# Patient Record
Sex: MALE | Race: WHITE | NOT HISPANIC OR LATINO | Employment: OTHER | URBAN - METROPOLITAN AREA
[De-identification: names, ages, dates, MRNs, and addresses within clinical notes are randomized per-mention and may not be internally consistent; named-entity substitution may affect disease eponyms.]

---

## 2017-01-02 ASSESSMENT — ENCOUNTER SYMPTOMS
INCREASED ENERGY: 0
ALTERED TEMPERATURE REGULATION: 0
HALLUCINATIONS: 0
CHILLS: 0
FEVER: 0
DECREASED APPETITE: 0
FATIGUE: 1
POLYPHAGIA: 0
NIGHT SWEATS: 1
POLYDIPSIA: 0
WEIGHT GAIN: 0
WEIGHT LOSS: 0

## 2017-01-05 ENCOUNTER — OFFICE VISIT (OUTPATIENT)
Dept: ORTHOPEDICS | Facility: CLINIC | Age: 52
End: 2017-01-05

## 2017-01-05 VITALS — WEIGHT: 287 LBS | HEIGHT: 76 IN | BODY MASS INDEX: 34.95 KG/M2

## 2017-01-05 DIAGNOSIS — M99.73 CONNECTIVE TISSUE AND DISC STENOSIS OF INTERVERTEBRAL FORAMINA OF LUMBAR REGION: ICD-10-CM

## 2017-01-05 DIAGNOSIS — M54.5 LOW BACK PAIN, UNSPECIFIED BACK PAIN LATERALITY, UNSPECIFIED CHRONICITY, WITH SCIATICA PRESENCE UNSPECIFIED: Primary | ICD-10-CM

## 2017-01-05 DIAGNOSIS — M47.26 OSTEOARTHRITIS OF SPINE WITH RADICULOPATHY, LUMBAR REGION: Primary | ICD-10-CM

## 2017-01-05 NOTE — Clinical Note
1/5/2017      RE: Tyler Barreto  [address] [facility] [address]       Orthopaedic Spine Clinic Consult Note 01/05/2017    Primary Care Physician: Taras Nagel  Referring Physician: Dr. Jules March    Chief Complaint: low back numbness, right leg radicupolathy    Hx of Present Illness:   Tyler Barreto is a pleasant 51 year old male with no signifcicant past medical history who presents to clinic today for evaluation of low back numbness and right leg radiculopathy.  Patient has had low back numbness and right leg pain/weakness/numbness/tingling since 10 years ago that has been gradually getting worse without any inciting event.  10% is back symptoms and 90% is right L4/L5 radicular pain.  Pain is 2/10 at best and 8/10 at worst.  Symptoms are exacerbated by standing and walking for long periods of time and improved with sitting and leaning forward. Patient denies bowel/bladder dysfunction.  Patient has normal dexterity in upper and lower extremities. Oswestry Disability Index is 20.    Conservative measures tried include:  Injections: none  Therapies: chiropractor 5-6 years ago  Medications: ibuprofen    Previous spine surgeries: none    This is not a worker's compensation injury.    Review of Systems:   Const: (negative, no fevers/chills or weight loss) ENT: (negative) Pulm: (negative, no shortness of breath) Cardio: (negative, no chest pain) GI: (negative) : (negative) Heme: (negative) MSK: (negative except as stated in HPI) Skin: (negative) Neuro: (negative except as stated in HPI)    Past Medical Hx:  No past medical history on file.    Past Surgical Hx:  No past surgical history on file.    Allergies:  No known allergies    Medications:    Current outpatient prescriptions:      traZODone (DESYREL) 50 MG tablet, Take 1-2 tablets ( mg) by mouth At Bedtime, Disp: 90 tablet, Rfl: 1    Social Hx:  Social History     Social History     Marital Status:      Spouse Name: Marianne      "Number of Children: 3     Years of Education: N/A     Occupational History      [employer]     Social History Main Topics     Smoking status: Former Smoker     Quit date: 01/21/1994     Smokeless tobacco: Current User     Types: Chew      Comment: Declined quit help     Alcohol Use: Yes     Drug Use: None     Sexual Activity: Not Asked     Other Topics Concern     None     Social History Narrative       Family Hx:  family history includes DIABETES in his mother; Eye Disorder in his father and mother; Hypertension in his mother.    Physical Exam:  Ht 1.94 m (6' 4.38\")  Wt 130.182 kg (287 lb)  BMI 34.59 kg/m2  General: no acute distress, alert and oriented x3   HEENT: head normocephalic and atraumatic, trachea midline  CV: skin is warm, peripheral pulses intact  Pulm: breathing is non-labored  Abd: soft, non-distended, non-tender    NEUROLOGICAL:  Lower extremities:  Motor Strength Right Left   Hip flexion: L1, L2, L3 5/5 5/5   Hip adduction: L2, L3 5/5 5/5   Knee flexion: S1 5/5 5/5   Knee extension: L3, L4 5/5 5/5   Ankle dosiflexion: L4, L5 5/5 5/5   EHL: L5 5/5 5/5   Ankle plantarflexion: S1 5/5 5/5   Sensation to Light Touch Right Left   Groin: L1 2/2 2/2   Medial thigh: L2 2/2 2/2   Anterior med thigh: L3 2/2 2/2   Med foot/med leg/ant knee thigh: L4 1/2 2/2   Dorsum foot/lateral leg: L5 1/2 2/2   Lat foot/post lat leg/post thigh: S1 2/2 2/2   Heel/ post med leg/ post med thigh: S2 2/2 2/2     Reflexes Right Left   Patellar 2+ 2+   Achilles 1+ 1+   Clonus no no     Fine motor coordination: Intact accuracy and speed bilaterally. Rhomberg test negative.    MUSCULOSKELETAL:  Inspection: no obvious kyphosis or lordosis, bony deformity, ecchymoses, erythema, edema, or skin lesions  ROM:   Back: full b/l forward flexion, extension, lateral flexion, rotation   Hip: full b/l flexion, extension, abduction, adduction, internal rotation, external rotation  Palpation:   Spinal facets: negative for " tenderness   Paraspinal: negative for tenderness   Iliac crest: negative for tenderness   SI joints: negative for tenderness   Sciatic notch: positive on right for tenderness  Tests:   Straight leg raise: positive for radiating pain down right leg    Imaging: Films reviewed with patient and family.  Plain x-rays: 1/5/17 XR lumbar spine   Impression:    1. Multilevel degenerative changes of the lumbar spine with most pronounced disc space narrowing and facet sclerosis at L4-L5 and L5-S1.  2. Grade 1 retrolisthesis of L4 on L5.    MRI: 9/16/16 MR lumbar spine w/o contrast  Impression:  - DDD L3-S1 with moderate central disc protrusion   - Right transforaminal stenosis L4-5 and L5-S1    Assessment: 51 year old male with 10 year history of gradual worsening without inciting event low back numbness and right leg pain/weakness/numbness/tingling, 90% right leg L4/L5 radiculopathy and 10% low back symptoms, with neurogenic claudication, without myelopathy.     Plan:  - Right L5 root transforaminal epidural steroid injection  - Discussed possible right L5-S1 foraminotomy as next step after NEWTON depending on results  - 500 mg Calcium twice a day and 1000 international units Vitamin D twice a day  - Discussed avoidance of lifting >10 lbs and excessive twisting, bending for 3 months after surgery    The patient is in agreement with plan above and has no further questions at this time. Tyler Barreto will follow up after transforaminal NEWTON or sooner if questions, concerns, or change in symptoms arise.    This patient was seen by and discussed with Dr. Rocha who is in agreement with the above assessment and plan.     Yordy Sarah MD, PhD 01/05/2017  Orthopaedic Surgery Resident, PGY-1  Pager: [phone]    Seen with PGY-1 Tyrel.  Pls see resident note.  Consult seen at request of Dr. Jules March (Orthopaedics, [facility]).    S> 50m, with 90% R leg (L5), 10% back pain x 10 yrs.  Worse with standing/walking; improved  with sitting/leaning forward.    Chiropractic tx 5-6 yrs ago; no formal PT.  No injections.  No spine surgery.    O> Mild decreased sensation R L5.  Otherwise neuro intact.  MRI and x-rays: (+) degen disc changes L3-4,L4-5,L5-S1.  (+) R foraminal stenosis L5-S1.    A> R L5 radiculopathy 2' to R L5-S1 foraminal stenosis.    P>  R L5 TFESI - explained dxtic purpose, and potential txtic effect.  - If with good response, may consider MIS R L5-S1 hemilaminectomy-foraminotomy.  Will not recommend fusion for degenerated discs for the following reasons: (1) much bigger/riskier surgery; (2) tradeoffs including stiffness and adjacent segment problems; (3) his pain is leg >> back.    - If (-) response, consider EMG.    TT > 30 mins, > 50% CC.    Jem Rocha MD

## 2017-01-05 NOTE — Clinical Note
1/5/2017       RE: Tyler Barreto  [address] [facility] [address]     Dear Colleague,    Thank you for referring your patient, Tyler Barreto, to the [facility] ORTHOPAEDIC [facility]IC at [facility]. Please see a copy of my visit note below.    Orthopaedic Spine Clinic Consult Note 01/05/2017    Primary Care Physician: Taras Nagel  Referring Physician: Dr. Jules March    Chief Complaint: low back numbness, right leg radicupolathy    Hx of Present Illness:   Tyler Barreto is a pleasant 51 year old male with no signifcicant past medical history who presents to clinic today for evaluation of low back numbness and right leg radiculopathy.  Patient has had low back numbness and right leg pain/weakness/numbness/tingling since 10 years ago that has been gradually getting worse without any inciting event.  10% is back symptoms and 90% is right L4/L5 radicular pain.  Pain is 2/10 at best and 8/10 at worst.  Symptoms are exacerbated by standing and walking for long periods of time and improved with sitting and leaning forward. Patient denies bowel/bladder dysfunction.  Patient has normal dexterity in upper and lower extremities. Oswestry Disability Index is 20.    Conservative measures tried include:  Injections: none  Therapies: chiropractor 5-6 years ago  Medications: ibuprofen    Previous spine surgeries: none    This is not a worker's compensation injury.    Review of Systems:   Const: (negative, no fevers/chills or weight loss) ENT: (negative) Pulm: (negative, no shortness of breath) Cardio: (negative, no chest pain) GI: (negative) : (negative) Heme: (negative) MSK: (negative except as stated in HPI) Skin: (negative) Neuro: (negative except as stated in HPI)    Past Medical Hx:  No past medical history on file.    Past Surgical Hx:  No past surgical history on file.    Allergies:  No known allergies    Medications:    Current outpatient prescriptions:       "traZODone (DESYREL) 50 MG tablet, Take 1-2 tablets ( mg) by mouth At Bedtime, Disp: 90 tablet, Rfl: 1    Social Hx:  Social History     Social History     Marital Status:      Spouse Name: Marianne     Number of Children: 3     Years of Education: N/A     Occupational History      [employer]     Social History Main Topics     Smoking status: Former Smoker     Quit date: 01/21/1994     Smokeless tobacco: Current User     Types: Chew      Comment: Declined quit help     Alcohol Use: Yes     Drug Use: None     Sexual Activity: Not Asked     Other Topics Concern     None     Social History Narrative       Family Hx:  family history includes DIABETES in his mother; Eye Disorder in his father and mother; Hypertension in his mother.    Physical Exam:  Ht 1.94 m (6' 4.38\")  Wt 130.182 kg (287 lb)  BMI 34.59 kg/m2  General: no acute distress, alert and oriented x3   HEENT: head normocephalic and atraumatic, trachea midline  CV: skin is warm, peripheral pulses intact  Pulm: breathing is non-labored  Abd: soft, non-distended, non-tender    NEUROLOGICAL:  Lower extremities:  Motor Strength Right Left   Hip flexion: L1, L2, L3 5/5 5/5   Hip adduction: L2, L3 5/5 5/5   Knee flexion: S1 5/5 5/5   Knee extension: L3, L4 5/5 5/5   Ankle dosiflexion: L4, L5 5/5 5/5   EHL: L5 5/5 5/5   Ankle plantarflexion: S1 5/5 ***/5   Sensation to Light Touch Right Left   Groin: L1 2/2 2/2   Medial thigh: L2 2/2 2/2   Anterior med thigh: L3 2/2 2/2   Med foot/med leg/ant knee thigh: L4 1/2 2/2   Dorsum foot/lateral leg: L5 1/2 2/2   Lat foot/post lat leg/post thigh: S1 2/2 2/2   Heel/ post med leg/ post med thigh: S2 2/2 2/2     Reflexes Right Left   Patellar 2+ 2+   Achilles 1+ 1+   Clonus no no     Fine motor coordination: Intact accuracy and speed bilaterally. Rhomberg test negative.    MUSCULOSKELETAL:  Inspection: no obvious kyphosis or lordosis, bony deformity, ecchymoses, erythema, edema, or skin lesions  ROM:   Back: " full b/l forward flexion, extension, lateral flexion, rotation   Hip: full b/l flexion, extension, abduction, adduction, internal rotation, external rotation  Palpation:   Spinal facets: negative for tenderness   Paraspinal: negative for tenderness   Iliac crest: negative for tenderness   SI joints: negative for tenderness   Sciatic notch: positive on right for tenderness  Tests:   Straight leg raise: positive for radiating pain down right leg    Imaging: Films reviewed with patient and family.  Plain x-rays: 1/5/17 XR lumbar spine   Impression:    1. Multilevel degenerative changes of the lumbar spine with most pronounced disc space narrowing and facet sclerosis at L4-L5 and L5-S1.  2. Grade 1 retrolisthesis of L4 on L5.    MRI: 9/16/16 MR lumbar spine w/o contrast  Impression:  - DDD L3-S1 with moderate central disc protrusion   - Right transforaminal stenosis L4-5 and L5-S1    Assessment: 51 year old male with 10 year history of gradual worsening without inciting event low back numbness and right leg pain/weakness/numbness/tingling, 90% right leg L4/L5 radiculopathy and 10% low back symptoms, with neurogenic claudication, without myelopathy.     Plan:  - L5 root transforaminal epidural steroid injection  - Discussed possible right L5-S1 foraminotomy as next step after NEWTON depending on results  - 500 mg Calcium twice a day and 1000 international units Vitamin D twice a day  - Discussed avoidance of lifting >10 lbs and excessive twisting, bending for 3 months after surgery    The patient is in agreement with plan above and has no further questions at this time. Tyler Barreto will follow up after transforaminal NEWTON or sooner if questions, concerns, or change in symptoms arise.    This patient was seen by and discussed with Dr. Rocha who is in agreement with the above assessment and plan.     Yordy Sarah MD, PhD 01/05/2017  Orthopaedic Surgery Resident, PGY-1  Pager: [phone]    Seen with PGY-1 Tyrel.   Pls see resident note.  Consult seen at request of Dr. Jules March (Orthopaedics, [facility]).    S> 50m, with 90% R leg (L5), 10% back pain x 10 yrs.  Worse with standing/walking; improved with sitting/leaning forward.    Chiropractic tx 5-6 yrs ago; no formal PT.  No injections.  No spine surgery.    O> Mild decreased sensation R L5.  Otherwise neuro intact.  MRI and x-rays: (+) degen disc changes L3-4,L4-5,L5-S1.  (+) R foraminal stenosis L5-S1.    A> R L5 radiculopathy 2' to R L5-S1 foraminal stenosis.    P>  R L5 TFESI - explained dxtic purpose, and potential txtic effect.  - If with good response, may consider MIS R L5-S1 hemilaminectomy-foraminotomy.  Will not recommend fusion for degenerated discs for the following reasons: (1) much bigger/riskier surgery; (2) tradeoffs including stiffness and adjacent segment problems; (3) his pain is leg >> back.    - If (-) response, consider EMG.    TT > 30 mins, > 50% CC.    Again, thank you for allowing me to participate in the care of your patient.      Sincerely,    Jem Rocha MD

## 2017-01-05 NOTE — PROGRESS NOTES
Orthopaedic Spine Clinic Consult Note 01/05/2017    Primary Care Physician: Taras Nagel  Referring Physician: Dr. Jules March    Chief Complaint: low back numbness, right leg radicupolathy    Hx of Present Illness:   Tyler Barreto is a pleasant 51 year old male with no signifcicant past medical history who presents to clinic today for evaluation of low back numbness and right leg radiculopathy.  Patient has had low back numbness and right leg pain/weakness/numbness/tingling since 10 years ago that has been gradually getting worse without any inciting event.  10% is back symptoms and 90% is right L4/L5 radicular pain.  Pain is 2/10 at best and 8/10 at worst.  Symptoms are exacerbated by standing and walking for long periods of time and improved with sitting and leaning forward. Patient denies bowel/bladder dysfunction.  Patient has normal dexterity in upper and lower extremities. Oswestry Disability Index is 20.    Conservative measures tried include:  Injections: none  Therapies: chiropractor 5-6 years ago  Medications: ibuprofen    Previous spine surgeries: none    This is not a worker's compensation injury.    Review of Systems:   Const: (negative, no fevers/chills or weight loss) ENT: (negative) Pulm: (negative, no shortness of breath) Cardio: (negative, no chest pain) GI: (negative) : (negative) Heme: (negative) MSK: (negative except as stated in HPI) Skin: (negative) Neuro: (negative except as stated in HPI)    Past Medical Hx:  No past medical history on file.    Past Surgical Hx:  No past surgical history on file.    Allergies:  No known allergies    Medications:    Current outpatient prescriptions:      traZODone (DESYREL) 50 MG tablet, Take 1-2 tablets ( mg) by mouth At Bedtime, Disp: 90 tablet, Rfl: 1    Social Hx:  Social History     Social History     Marital Status:      Spouse Name: Marianne     Number of Children: 3     Years of Education: N/A     Occupational History       "[employer]     Social History Main Topics     Smoking status: Former Smoker     Quit date: 01/21/1994     Smokeless tobacco: Current User     Types: Chew      Comment: Declined quit help     Alcohol Use: Yes     Drug Use: None     Sexual Activity: Not Asked     Other Topics Concern     None     Social History Narrative       Family Hx:  family history includes DIABETES in his mother; Eye Disorder in his father and mother; Hypertension in his mother.    Physical Exam:  Ht 1.94 m (6' 4.38\")  Wt 130.182 kg (287 lb)  BMI 34.59 kg/m2  General: no acute distress, alert and oriented x3   HEENT: head normocephalic and atraumatic, trachea midline  CV: skin is warm, peripheral pulses intact  Pulm: breathing is non-labored  Abd: soft, non-distended, non-tender    NEUROLOGICAL:  Lower extremities:  Motor Strength Right Left   Hip flexion: L1, L2, L3 5/5 5/5   Hip adduction: L2, L3 5/5 5/5   Knee flexion: S1 5/5 5/5   Knee extension: L3, L4 5/5 5/5   Ankle dosiflexion: L4, L5 5/5 5/5   EHL: L5 5/5 5/5   Ankle plantarflexion: S1 5/5 5/5   Sensation to Light Touch Right Left   Groin: L1 2/2 2/2   Medial thigh: L2 2/2 2/2   Anterior med thigh: L3 2/2 2/2   Med foot/med leg/ant knee thigh: L4 1/2 2/2   Dorsum foot/lateral leg: L5 1/2 2/2   Lat foot/post lat leg/post thigh: S1 2/2 2/2   Heel/ post med leg/ post med thigh: S2 2/2 2/2     Reflexes Right Left   Patellar 2+ 2+   Achilles 1+ 1+   Clonus no no     Fine motor coordination: Intact accuracy and speed bilaterally. Rhomberg test negative.    MUSCULOSKELETAL:  Inspection: no obvious kyphosis or lordosis, bony deformity, ecchymoses, erythema, edema, or skin lesions  ROM:   Back: full b/l forward flexion, extension, lateral flexion, rotation   Hip: full b/l flexion, extension, abduction, adduction, internal rotation, external rotation  Palpation:   Spinal facets: negative for tenderness   Paraspinal: negative for tenderness   Iliac crest: negative for tenderness   SI " joints: negative for tenderness   Sciatic notch: positive on right for tenderness  Tests:   Straight leg raise: positive for radiating pain down right leg    Imaging: Films reviewed with patient and family.  Plain x-rays: 1/5/17 XR lumbar spine   Impression:    1. Multilevel degenerative changes of the lumbar spine with most pronounced disc space narrowing and facet sclerosis at L4-L5 and L5-S1.  2. Grade 1 retrolisthesis of L4 on L5.    MRI: 9/16/16 MR lumbar spine w/o contrast  Impression:  - DDD L3-S1 with moderate central disc protrusion   - Right transforaminal stenosis L4-5 and L5-S1    Assessment: 51 year old male with 10 year history of gradual worsening without inciting event low back numbness and right leg pain/weakness/numbness/tingling, 90% right leg L4/L5 radiculopathy and 10% low back symptoms, with neurogenic claudication, without myelopathy.     Plan:  - Right L5 root transforaminal epidural steroid injection  - Discussed possible right L5-S1 foraminotomy as next step after NEWTON depending on results  - 500 mg Calcium twice a day and 1000 international units Vitamin D twice a day  - Discussed avoidance of lifting >10 lbs and excessive twisting, bending for 3 months after surgery    The patient is in agreement with plan above and has no further questions at this time. Tyler Barreto will follow up after transforaminal NEWTON or sooner if questions, concerns, or change in symptoms arise.    This patient was seen by and discussed with Dr. Rocha who is in agreement with the above assessment and plan.     Yordy Sarah MD, PhD 01/05/2017  Orthopaedic Surgery Resident, PGY-1  Pager: [phone]

## 2017-01-05 NOTE — NURSING NOTE
"Reason For Visit:   Chief Complaint   Patient presents with     Consult     Lumbar DDD, Spinal Stenosis.        Primary MD: Taras Marina  Ref. MD: Dr. Jules March. [place].     ?  No    Occupation:  [employer].  Currently working? Yes.  Work status?  Full time.    Date of injury: none  Type of injury: none.    Date of surgery: none.   Type of surgery: none.    Smoker: No  Request smoking cessation information: No    Ht 1.94 m (6' 4.38\")  Wt 130.182 kg (287 lb)  BMI 34.59 kg/m2    Pain Assessment  Patient Currently in Pain: Yes  Primary Pain Location: Back  Pain Orientation: Lower  Other Pain Locations: Radiates down right leg. Numbness and pins and needles in anterior right foot.   Pain Descriptors: Constant, Shooting, Dull, Aching  Alleviating Factors: Stretching, Rest  Aggravating Factors: Standing, Walking    Oswestry (CARY) Questionnaire    OSWESTRY DISABILITY INDEX 1/2/2017   SECTION 1-PAIN INTENSITY 2  The pain is moderate at the moment.   SECTION 2-PERSONAL CARE (WASHING,DRESSING,ETC.) 0  I can look after myself normally without causing additional pain.   SECTION 3-LIFTING 0  I can lift heavy weights without additional pain.   SECTION 4-WALKING 2  Pain prevents me from walking more than a quarter of a mile.   SECTION 5-SITTING 0  I can sit in any chair as long as I like   SECTION 6-STANDING 2  Pain prevents me from standing for more than 1 hour.   SECTION 7-SLEEPING 1  My sleep is occasionally interrupted by pain.   SECTION 8-SEX LIFE (IF APPLICABLE) 1  My sex life is normal but causes some additional pain.   SECTION 9-SOCIAL LIFE 1  My social life is normal but increases the degree of pain.   SECTION 10-TRAVELING 1  I can travel anywhere but it gives me additional pain.   Oswestry Disability Index: Count 10   CARY: Total Score = SUM (total for answered questions) 10   Computed Oswestry Score 20 (%)                  Numeric Rating Scale:  VAS Scores     VAS Survey 1/2/2017   What " is your level of back pain during the last week: 4.0   What is your level of RIGHT leg pain during the last week: 7.0   What is your level of LEFT leg pain during the last week: 0   What is your level of neck pain during the last week: 0   What is your level of RIGHT arm pain during the last week: 0   What is your level of LEFT arm pain during the last week: 0                Promis 10 Assessment    PROMIS 10 1/2/2017   In general, would you say your health is: Very Good = 4   In general, would you say your quality of life is: Very good = 4   In general, how would you rate your physical health? Very good = 4   In general, how would you rate your mental health, including your mood and your ability to think? Good = 3   In general, how would you rate your satisfaction with your social activities and relationships? Good = 3   In general, please rate how well you carry out your usual social activities and roles Very good = 4   To what extent are you able to carry out your everyday physical activities such as walking, climbing stairs, carrying groceries, or moving a chair? Moderately = 3   How often have you been bothered by emotional problems such as feeling anxious, depressed or irritable? Sometimes = 3   How would you rate your fatigue on average? Moderate = 3   How would you rate your pain on average?   0 = No Pain  to  10 = Worst Imaginable Pain 4   Global Physical Health Score : Raw Score 13 (SUM : G03 - G06 - G07 - G08)   Global Mentall Health Score : Raw Score 13 (SUM : G02 - G04 - G05 - G10)   Total (Physical + Mental Health Score) 26

## 2017-01-05 NOTE — NURSING NOTE
SAFETY INJECTION WORKSHEET    Epidural Steriod Injection: lumbar spine (Right L5-S1 (L5 root) transforaminal NEWTON)  Are you on blood thinners or platelet inhibitors? No  Are you a diabetic? No  Are you allergic to dye? No  Are you currently on antibiotics? No  Weight? Wt Readings from Last 1 Encounters:  01/05/17 : 130.182 kg (287 lb)    Have you had a flu shot within the last 10 days? No     Written education materials provided and verbal directions given per RN delegation. Ashley Randle LPN

## 2017-01-05 NOTE — MR AVS SNAPSHOT
After Visit Summary   1/5/2017    Tyler Barreto    MRN: [id number]           Patient Information     Date Of Birth          1965        Visit Information        Provider Department      1/5/2017 9:00 AM Jem Rocha MD [facility]        Today's Diagnoses     Osteoarthritis of spine with radiculopathy, lumbar region    -  1        Follow-ups after your visit        Your next 10 appointments already scheduled     Jan 09, 2017 10:00 AM   XR LUMBAR TRANSFORAMINAL INJ SINGLE with UCXR3,  IMAGING NURSE,  NEURO RAD   [facility] ([facility])    [address] [address] [phone]           For nerve root injection, please send or bring copies of any MRIs or other scans you have had.  Bring a list of your current medicines to your exam. (Include vitamins, minerals and over-the-counter medicines.) Leave your valuables at home.  Plan to have someone drive you home afterward.  Stop taking the following medicines (but talk to your doctor first):   If you take blood thinners, you may need to stop taking them a few days before treatment. Talk to your doctor before stopping these medicines.Stop taking Coumadin (warfarin) 3 days before treatment. Restart the day after treatment.   If you take Plavix, Ticlid, Pletal or Persantine, please ask your doctor if you should stop these medicines. You may need extra tests on the morning of your scan. You may take your other medicines as normal.  Stop all food and drink (including water) 3 hours before your test or treatment.  Please tell the doctor:   If you are allergic to X-ray dye (contrast fluid).   If you may be pregnant.  Injections take about 30 to 45 minutes. Most people spend up to 2 hours in the clinic or hospital.  Please call the Imaging Department at your exam site with any questions              Future tests that were ordered for you today      "Open Future Orders        Priority Expected Expires Ordered    X-ray Transforaminal lumbar single inj Routine 1/5/2017 1/5/2018 1/5/2017            Who to contact     Please call your clinic at [phone] to:    Ask questions about your health    Make or cancel appointments    Discuss your medicines    Learn about your test results    Speak to your doctor   If you have compliments or concerns about an experience at your clinic, or if you wish to file a complaint, please contact [facility] Patient Relations at [phone] or email us at [email or web address]         Additional Information About Your Visit        [employer] Information     [employer] gives you secure access to your electronic health record. If you see a primary care provider, you can also send messages to your care team and make appointments. If you have questions, please call your primary care clinic.  If you do not have a primary care provider, please call [phone] and they will assist you.      [employer] is an electronic gateway that provides easy, online access to your medical records. With [employer], you can request a clinic appointment, read your test results, renew a prescription or communicate with your care team.     To access your existing account, please contact your [facility] or call [phone] for assistance.        Care EveryWhere ID     This is your Care EveryWhere ID. This could be used by other organizations to access your [place] medical records  [id number]        Your Vitals Were     Height BMI (Body Mass Index)                1.94 m (6' 4.38\") 34.59 kg/m2           Blood Pressure from Last 3 Encounters:   09/10/13 136/82   02/18/13 124/68   02/11/13 146/83    Weight from Last 3 Encounters:   01/05/17 130.182 kg (287 lb)   09/10/13 112.7 kg (248 lb 7.3 oz)   02/18/13 110.678 kg (244 lb)               Primary Care Provider Office Phone # Fax #    Taras Marina MD " [phone]       [facility] [address] [facility] [address]  [place] [facility] [address]        Thank you!     Thank you for choosing [facility]  for your care. Our goal is always to provide you with excellent care. Hearing back from our patients is one way we can continue to improve our services. Please take a few minutes to complete the written survey that you may receive in the mail after your visit with us. Thank you!             Your Updated Medication List - Protect others around you: Learn how to safely use, store and throw away your medicines at www.disposemymeds.org.          This list is accurate as of: 1/5/17 10:42 AM.  Always use your most recent med list.                   Brand Name Dispense Instructions for use    traZODone 50 MG tablet    DESYREL    90 tablet    Take 1-2 tablets ( mg) by mouth At Bedtime

## 2017-01-08 PROBLEM — M47.26 OSTEOARTHRITIS OF SPINE WITH RADICULOPATHY, LUMBAR REGION: Status: ACTIVE | Noted: 2017-01-08

## 2017-01-08 PROBLEM — M99.73 CONNECTIVE TISSUE AND DISC STENOSIS OF INTERVERTEBRAL FORAMINA OF LUMBAR REGION: Status: ACTIVE | Noted: 2017-01-08

## 2017-01-08 NOTE — PROGRESS NOTES
Seen with PGY-1 Tyrel.  Pls see resident note.  Consult seen at request of Dr. Jules March (Orthopaedics, [facility]).    S> 50m, with 90% R leg (L5), 10% back pain x 10 yrs.  Worse with standing/walking; improved with sitting/leaning forward.    Chiropractic tx 5-6 yrs ago; no formal PT.  No injections.  No spine surgery.    O> Mild decreased sensation R L5.  Otherwise neuro intact.  MRI and x-rays: (+) degen disc changes L3-4,L4-5,L5-S1.  (+) R foraminal stenosis L5-S1.    A> R L5 radiculopathy 2' to R L5-S1 foraminal stenosis.    P>  R L5 TFESI - explained dxtic purpose, and potential txtic effect.  - If with good response, may consider MIS R L5-S1 hemilaminectomy-foraminotomy.  Will not recommend fusion for degenerated discs for the following reasons: (1) much bigger/riskier surgery; (2) tradeoffs including stiffness and adjacent segment problems; (3) his pain is leg >> back.    - If (-) response, consider EMG.    TT > 30 mins, > 50% CC.

## 2017-08-23 DIAGNOSIS — M54.9 BACK PAIN, UNSPECIFIED BACK LOCATION, UNSPECIFIED BACK PAIN LATERALITY, UNSPECIFIED CHRONICITY: Primary | ICD-10-CM

## 2017-08-24 ENCOUNTER — OFFICE VISIT (OUTPATIENT)
Dept: ORTHOPEDICS | Facility: CLINIC | Age: 52
End: 2017-08-24

## 2017-08-24 VITALS — BODY MASS INDEX: 33.69 KG/M2 | WEIGHT: 285.3 LBS | HEIGHT: 77 IN

## 2017-08-24 DIAGNOSIS — M51.26 DISPLACEMENT OF LUMBAR INTERVERTEBRAL DISC WITHOUT MYELOPATHY: ICD-10-CM

## 2017-08-24 DIAGNOSIS — M99.73 CONNECTIVE TISSUE AND DISC STENOSIS OF INTERVERTEBRAL FORAMINA OF LUMBAR REGION: Primary | ICD-10-CM

## 2017-08-24 DIAGNOSIS — M54.16 LUMBAR RADICULOPATHY: ICD-10-CM

## 2017-08-24 NOTE — MR AVS SNAPSHOT
"              After Visit Summary   8/24/2017    Tyler Barreto    MRN: [id number]           Patient Information     Date Of Birth          1965        Visit Information        Provider Department      8/24/2017 2:20 PM Jem Rocha MD [facility]        Today's Diagnoses     Right L5-S1 foraminal stenosis    -  1    Right disc herniation L4-5        Radiculopathy R L5           Follow-ups after your visit        Who to contact     Please call your clinic at [phone] to:    Ask questions about your health    Make or cancel appointments    Discuss your medicines    Learn about your test results    Speak to your doctor   If you have compliments or concerns about an experience at your clinic, or if you wish to file a complaint, please contact [facility] Patient Relations at [phone] or email us at [email or web address]         Additional Information About Your Visit        MyChart Information     [employer] gives you secure access to your electronic health record. If you see a primary care provider, you can also send messages to your care team and make appointments. If you have questions, please call your primary care clinic.  If you do not have a primary care provider, please call [phone] and they will assist you.      [employer] is an electronic gateway that provides easy, online access to your medical records. With [employer], you can request a clinic appointment, read your test results, renew a prescription or communicate with your care team.     To access your existing account, please contact your [facility] or call [phone] for assistance.        Care EveryWhere ID     This is your Care EveryWhere ID. This could be used by other organizations to access your [place] medical records  [id number]        Your Vitals Were     Height BMI (Body Mass Index)                1.962 m (6' 5.25\") 33.61 kg/m2     "       Blood Pressure from Last 3 Encounters:   01/09/17 143/89   09/10/13 136/82   02/18/13 124/68    Weight from Last 3 Encounters:   08/24/17 129.4 kg (285 lb 4.8 oz)   01/05/17 130.2 kg (287 lb)   09/10/13 112.7 kg (248 lb 7.3 oz)              We Performed the Following     Mariajose-Operative Worksheet        Primary Care Provider Office Phone # Fax #    Taras Marina [phone]       [facility] [place] [address] [facility] [address]  [place] [address]        Equal Access to Services     [facility]: Hadii aad ku hadasho Soomaali, waaxda luqadaha, qaybta kaalmada adeegyada, waxbobo skaggsin hayaan sergio mejia . So [facility] [phone].    ATENCIÓN: Si habla español, tiene a llanos disposición servicios gratuitos de asistencia lingüística. Ll[facility] [phone].    We comply with applicable federal civil rights laws and [place] laws. We do not discriminate on the basis of race, color, national origin, age, disability sex, sexual orientation or gender identity.            Thank you!     Thank you for choosing [facility]  for your care. Our goal is always to provide you with excellent care. Hearing back from our patients is one way we can continue to improve our services. Please take a few minutes to complete the written survey that you may receive in the mail after your visit with us. Thank you!             Your Updated Medication List - Protect others around you: Learn how to safely use, store and throw away your medicines at www.disposemymeds.org.          This list is accurate as of: 8/24/17 11:59 PM.  Always use your most recent med list.                   Brand Name Dispense Instructions for use Diagnosis    traZODone 50 MG tablet    DESYREL    90 tablet    Take 1-2 tablets ( mg) by mouth At Bedtime    Insomnia, unspecified

## 2017-08-24 NOTE — PROGRESS NOTES
Chief Concern/Diagnosis: Follow-up right lower extremity pain radiating down posterior aspect of right thigh calf and into dorsum of foot ×10 years    Prior Surgeries:    1. None    Subjective:  Dear-year-old otherwise healthy gentleman returns to clinic for follow-up of low back pain radiating down the posterior aspect of his right lower extremity into the dorsum of his foot. He was last seen in our clinic in December 2016 where he was diagnosed with right L5 radiculopathy and recommended to undergo right L5 transforaminal epidural steroid injection. He states he did undergo this injection in January 2017. He does note that the injection itself was extremely painful. For the first month following the injection, he did note that his right lower extremity felt by flatus, after this, he noted a partially 3-4 months of good relief of his right lower extremity symptoms. Today, he states that his pain is again primarily in his right lower extremity, designating 80% of the symptoms of the right leg in 20% to his low back. He is not interested in undergoing repeat injection secondary to the pain from the injection procedure undergone definitive nature of the injection itself. He is wishing for surgical intervention for more definitive management of his right lower extremity symptoms. At this point, the pain is significantly limiting his physical activity. He notes that he cannot stand for longer than 10 minutes and the pain does intermittently wake him from sleep. He notes that he is chronically tired secondary to this. He has no acute change in his pain character, he continues to rated as 6 out of 10, it is improved with seating. As [id number], the patient can sit on limited, however whenever he tries to rest his feet he does note on since of this pain. He has no acute changes in his neurologic status, no new bowel or bladder symptoms, outside of his low back pain rating to his right lower extremity he has no new  questions or concerns. He denies any symptoms in his left lower extremity. denies new numbness/tingling/weakness, denies fevers, chills, sob, cp.      CARY:  22 %  VAS:  6, 6  [id number]:     General physical health: 14   General mental health:  14    Physical Examination:    Gen:  Alert, no acute distress, well nourished, appears stated age    Psych:  Normal affect, nonpressured speech    Musculoskeletal:     Spine:   Stands erect with no list or stoop   Overall nml sagittal alignment   No scoliotic curves grossly evident      L2-3: Hip flexion L and R 5/5 strength         L4:  Knee extension L and R 5/5 strength        L5:  Foot / EHL dorsiflexion L and R 5/5 strength        S1:  Plantarflexion  L and R 5/5 strength   Sensation intact to light touch L3-S1 distribution BLE    Imaging:  Previous MRI from 2016 is reviewed, it does demonstrate multilevel degenerative changes most pronounced at the L4 S1 with with disc flattening, decreased signal on MRI, posterior endplate edema, there is significant paracentral disc herniation at L4 impinging on the right nerve root causing moderate foraminal stenosis at this level, as well as facet hypertrophy and central disc herniation resulting in severe foraminal stenosis on the right L5 nerve root      Assessment:   51 year old male with the followin. Right L4 5, L5-S1 radiculopathy status post transforaminal epidural corticosteroid injection right L5/S1 in 2017 with good albeit short-lived response      Plan:  1. We had an extensive discussion with this patient today regarding further treatment options. He has had good response to transforaminal epidural steroid injection. He did note that this procedure was exquisitely painful and he does not wish to undergo repeat injection. He is looking for more long lasting, definitive relief of his right lower extremity symptoms. He states that his symptoms are primarily, 90%, isolated to his right posterior thigh  leg and into the dorsum of his foot.  In terms of more definitive management, this would involve surgical decompression. We did note that his previous MRI was performed approximately 1 year ago, however the patient notes she's had no significant change in symptoms in the interim therefore, he does not need repeat MRI. We discussed two-level surgical decompression at L4-L5, and L5-S1. We explained that this would be done on an outpatient basis, here in our same-day surgery center. We explained that he would need time off of work following his procedure, for an estimated period 4-6 weeks. We also explained postoperative restrictions including a 10 pound lifting restriction for 6 weeks following surgery. We discussed risks benefits and alternatives decompression including risk of infection, risk of bleeding, risk of injury to blood vessels or nerves. We also discussed risk of continued pain postoperatively as well as need for repeat surgery. Furthermore, we discussed the possibility of fusion in the future. The patient understands. We reviewed imaging with the patient is white. All questions are answered. They're happy with the plan as dictated above area they would like to proceed with surgical decompression, which will be scheduled at their earliest convenience here in our same-day surgical center. Otherwise, they've no new questions or concerns. The patient can continue to be weightbearing as tolerated, activity as tolerated, and follow up on an as-needed basis prior to surgery. He is given contact information for clinic to call with any questions or urgent interim between now and surgery.      Seen and discussed with Dr. Aj West MD MS  Orthopaedic Surgery PGY-4   Pager:  [phone]    Answers for HPI/ROS submitted by the patient on 8/23/2017   General Symptoms: No  Skin Symptoms: No  HENT Symptoms: No  EYE SYMPTOMS: No  HEART SYMPTOMS: No  LUNG SYMPTOMS: No  INTESTINAL SYMPTOMS: No  URINARY  SYMPTOMS: No  REPRODUCTIVE SYMPTOMS: No  SKELETAL SYMPTOMS: No  BLOOD SYMPTOMS: No  NERVOUS SYSTEM SYMPTOMS: No  MENTAL HEALTH SYMPTOMS: No      Addendum:  I personally saw and evaluated patient with Dr. West, and I agree with findings and plan outlined in the note.  S> 50/m, seen 1/5/17 for R leg radicular pain 90%, LBP 10%.  (+) good temporary relief with R L5-S1 TFESI.  MRI 09/2016 showed R-sided stenosis L4-5,L5-S1 - both of which may be pinching the R L5 nerve root.  A>  - Right L4-5 and L5-S1 stenosis.  - R L5 radicular pain.  P>  Discussed options.  Surg request placed:  MIS R L4-5 and L5-S1 hemilaminectomy, foraminotomy with R L4-5 diskectomy.  May be done outpatient at [facility].  Discussed with patient rationale, risks, benefits, alternatives.   Discussed postop restrictions, and need to take time off work 4-6 wks.    TT > 25 mins, > 50% CC.

## 2017-08-24 NOTE — LETTER
8/24/2017       RE: Tyler Barreot  [address] [address]     Dear Colleague,    Thank you for referring your patient, Tyler Barreto, to the [facility] at [facility]. Please see a copy of my visit note below.    Chief Concern/Diagnosis: Follow-up right lower extremity pain radiating down posterior aspect of right thigh calf and into dorsum of foot ×10 years    Prior Surgeries:    1. None    Subjective:  Dear-year-old otherwise healthy gentleman returns to clinic for follow-up of low back pain radiating down the posterior aspect of his right lower extremity into the dorsum of his foot. He was last seen in our clinic in December 2016 where he was diagnosed with right L5 radiculopathy and recommended to undergo right L5 transforaminal epidural steroid injection. He states he did undergo this injection in January 2017. He does note that the injection itself was extremely painful. For the first month following the injection, he did note that his right lower extremity felt by flatus, after this, he noted a partially 3-4 months of good relief of his right lower extremity symptoms. Today, he states that his pain is again primarily in his right lower extremity, designating 80% of the symptoms of the right leg in 20% to his low back. He is not interested in undergoing repeat injection secondary to the pain from the injection procedure undergone definitive nature of the injection itself. He is wishing for surgical intervention for more definitive management of his right lower extremity symptoms. At this point, the pain is significantly limiting his physical activity. He notes that he cannot stand for longer than 10 minutes and the pain does intermittently wake him from sleep. He notes that he is chronically tired secondary to this. He has no acute change in his pain character, he continues to rated as 6 out of 10, it is improved with seating. As  [id number], the patient can sit on limited, however whenever he tries to rest his feet he does note on since of this pain. He has no acute changes in his neurologic status, no new bowel or bladder symptoms, outside of his low back pain rating to his right lower extremity he has no new questions or concerns. He denies any symptoms in his left lower extremity. denies new numbness/tingling/weakness, denies fevers, chills, sob, cp.      CARY:  22 %  VAS:  6, 6  [id number]:     General physical health: 14   General mental health:  14    Physical Examination:    Gen:  Alert, no acute distress, well nourished, appears stated age    Psych:  Normal affect, nonpressured speech    Musculoskeletal:     Spine:   Stands erect with no list or stoop   Overall nml sagittal alignment   No scoliotic curves grossly evident      L2-3: Hip flexion L and R 5/5 strength         L4:  Knee extension L and R 5/5 strength        L5:  Foot / EHL dorsiflexion L and R 5/5 strength        S1:  Plantarflexion  L and R 5/5 strength   Sensation intact to light touch L3-S1 distribution BLE    Imaging:  Previous MRI from 2016 is reviewed, it does demonstrate multilevel degenerative changes most pronounced at the L4 S1 with with disc flattening, decreased signal on MRI, posterior endplate edema, there is significant paracentral disc herniation at L4 impinging on the right nerve root causing moderate foraminal stenosis at this level, as well as facet hypertrophy and central disc herniation resulting in severe foraminal stenosis on the right L5 nerve root    Assessment:   51 year old male with the followin. Right L4 5, L5-S1 radiculopathy status post transforaminal epidural corticosteroid injection right L5/S1 in 2017 with good albeit short-lived response      Plan:  1. We had an extensive discussion with this patient today regarding further treatment options. He has had good response to transforaminal epidural steroid injection.  He did note that this procedure was exquisitely painful and he does not wish to undergo repeat injection. He is looking for more long lasting, definitive relief of his right lower extremity symptoms. He states that his symptoms are primarily, 90%, isolated to his right posterior thigh leg and into the dorsum of his foot.  In terms of more definitive management, this would involve surgical decompression. We did note that his previous MRI was performed approximately 1 year ago, however the patient notes she's had no significant change in symptoms in the interim therefore, he does not need repeat MRI. We discussed two-level surgical decompression at L4-L5, and L5-S1. We explained that this would be done on an outpatient basis, here in our same-day surgery center. We explained that he would need time off of work following his procedure, for an estimated period 4-6 weeks. We also explained postoperative restrictions including a 10 pound lifting restriction for 6 weeks following surgery. We discussed risks benefits and alternatives decompression including risk of infection, risk of bleeding, risk of injury to blood vessels or nerves. We also discussed risk of continued pain postoperatively as well as need for repeat surgery. Furthermore, we discussed the possibility of fusion in the future. The patient understands. We reviewed imaging with the patient is white. All questions are answered. They're happy with the plan as dictated above area they would like to proceed with surgical decompression, which will be scheduled at their earliest convenience here in our same-day surgical center. Otherwise, they've no new questions or concerns. The patient can continue to be weightbearing as tolerated, activity as tolerated, and follow up on an as-needed basis prior to surgery. He is given contact information for clinic to call with any questions or urgent interim between now and surgery.    Seen and discussed with   Aj West MD MS  Orthopaedic Surgery PGY-4   Pager:  [phone]    Addendum:  I personally saw and evaluated patient with Dr. West, and I agree with findings and plan outlined in the note.  S> 50/m, seen 1/5/17 for R leg radicular pain 90%, LBP 10%.  (+) good temporary relief with R L5-S1 TFESI.  MRI 09/2016 showed R-sided stenosis L4-5,L5-S1 - both of which may be pinching the R L5 nerve root.  A>  - Right L4-5 and L5-S1 stenosis.  - R L5 radicular pain.  P>  Discussed options.  Surg request placed:  MIS R L4-5 and L5-S1 hemilaminectomy, foraminotomy with R L4-5 diskectomy.  [facility] be done outpatient at [facility].  Discussed with patient rationale, risks, benefits, alternatives.   Discussed postop restrictions, and need to take time off work 4-6 wks.    TT > 25 mins, > 50% CC    Again, thank you for allowing me to participate in the care of your patient.      Sincerely,    Jem Rocha MD

## 2017-08-24 NOTE — NURSING NOTE
Reason For Visit:   Chief Complaint   Patient presents with     Back Pain     Follow up for back pain.        Primary MD: Taras Marina  Ref. MD: Established    ?  No     Occupation:  Home [employer] company.  Currently working? Yes.  Work status?  Full time.     Date of injury: none  Type of injury: none.     Date of surgery: none.   Type of surgery: none.     Smoker: No  Request smoking cessation information: No  Pain Assessment  Patient Currently in Pain: Yes  0-10 Pain Scale: 6  Primary Pain Location: Back  Pain Orientation: Lower  Other Pain Locations:  (Right leg and buttcosk)  Pain Descriptors: Tingling, Numbness    Oswestry (CARY) Questionnaire    OSWESTRY DISABILITY INDEX 8/23/2017   Section 1 - Pain intensity 2   Section 2 - Personal care (washing, dressing, etc.)  0   Section 3 - Lifting 1   Section 4 - Walking 1   Section 5 - Sitting 0   Section 6 - Standing 4   Section 7 - Sleeping 1   Section 8 - Sex life (if applicable) 1   Section 9 - Social life 1   Section 10 - Traveling 0   Count 10   Sum 11   Oswestry Score (%) 22   SECTION 1-PAIN INTENSITY The pain is moderate at the moment.   SECTION 2-PERSONAL CARE (WASHING,DRESSING,ETC.) I can look after myself normally without causing additional pain.   SECTION 3-LIFTING I can lift heavy weights but it gives me additional pain.   SECTION 4-WALKING Pain prevents me from walking more than one mile.   SECTION 5-SITTING I can sit in any chair as long as I like.   SECTION 6-STANDING Pain prevents me from standing for more than 10 minutes.   SECTION 7-SLEEPING My sleep is occasionally interrupted by pain.   SECTION 8-SEX LIFE (IF APPLICABLE) My sex life is normal but causes some additional pain.   SECTION 9-SOCIAL LIFE My social life is normal but increases the degree of pain.   SECTION 10-TRAVELING I can travel anywhere without pain.   Oswestry Disability Index: Count 10   CARY: Total Score = SUM (total for answered questions) 11   Computed Oswestry Score  22 (%)   Some recent data might be hidden        Numeric Rating Scale:  VAS Scores     VAS Survey 8/23/2017   What is your level of back pain during the last week: 6   What is your level of RIGHT leg pain during the last week: 7   What is your level of LEFT leg pain during the last week: 0   What is your level of neck pain during the last week: 2   What is your level of RIGHT arm pain during the last week: 0   What is your level of LEFT arm pain during the last week: 0      Promis 10 Assessment    PROMIS 10 8/23/2017   In general, would you say your health is: Very good   In general, would you say your quality of life is: Very good   In general, how would you rate your physical health? Very good   In general, how would you rate your mental health, including your mood and your ability to think? Good   In general, how would you rate your satisfaction with your social activities and relationships? Very good   In general, please rate how well you carry out your usual social activities and roles Very good   To what extent are you able to carry out your everyday physical activities such as walking, climbing stairs, carrying groceries, or moving a chair? Mostly   How often have you been bothered by emotional problems such as feeling anxious, depressed or irritable? Sometimes   How would you rate your fatigue on average? Moderate   How would you rate your pain on average?   0 = No Pain  to  10 = Worst Imaginable Pain 5   Global Physical Health Score : Raw Score 14 (SUM : G03 - G06 - G07 - G08)   Global Mentall Health Score : Raw Score 14 (SUM : G02 - G04 - G05 - G10)   Total (Physical + Mental Health Score) 28   In general, would you say your health is: 4   In general, would you say your quality of life is: 4   In general, how would you rate your physical health? 4   In general, how would you rate your mental health, including your mood and your ability to think? 3   In general, how would you rate your satisfaction with  your social activities and relationships? 4   In general, please rate how well you carry out your usual social activities and roles. (This includes activities at home, at work and in your community, and responsibilities as a parent, child, spouse, employee, friend, etc.) 4   To what extent are you able to carry out your everyday physical activities such as walking, climbing stairs, carrying groceries, or moving a chair? 4   In the past 7 days, how often have you been bothered by emotional problems such as feeling anxious, depressed, or irritable? 3   In the past 7 days, how would you rate your fatigue on average? 3   In the past 7 days, how would you rate your pain on average, where 0 means no pain, and 10 means worst imaginable pain? 5   Global Mental Health Score 14   Global Physical Health Score 14   PROMIS TOTAL - SUBSCORES 28   Pain question re-calculation - no clinical value 3   Some recent data might be hidden                Ijeoma Harper LPN

## 2017-08-24 NOTE — LETTER
8/24/2017      RE: Tyler FINK Estevan  [address] [facility] [address]       Chief Concern/Diagnosis: Follow-up right lower extremity pain radiating down posterior aspect of right thigh calf and into dorsum of foot ×10 years    Prior Surgeries:    1. None    Subjective:  Dear-year-old otherwise healthy gentleman returns to clinic for follow-up of low back pain radiating down the posterior aspect of his right lower extremity into the dorsum of his foot. He was last seen in our clinic in December 2016 where he was diagnosed with right L5 radiculopathy and recommended to undergo right L5 transforaminal epidural steroid injection. He states he did undergo this injection in January 2017. He does note that the injection itself was extremely painful. For the first month following the injection, he did note that his right lower extremity felt by flatus, after this, he noted a partially 3-4 months of good relief of his right lower extremity symptoms. Today, he states that his pain is again primarily in his right lower extremity, designating 80% of the symptoms of the right leg in 20% to his low back. He is not interested in undergoing repeat injection secondary to the pain from the injection procedure undergone definitive nature of the injection itself. He is wishing for surgical intervention for more definitive management of his right lower extremity symptoms. At this point, the pain is significantly limiting his physical activity. He notes that he cannot stand for longer than 10 minutes and the pain does intermittently wake him from sleep. He notes that he is chronically tired secondary to this. He has no acute change in his pain character, he continues to rated as 6 out of 10, it is improved with seating. As [id number], the patient can sit on limited, however whenever he tries to rest his feet he does note on since of this pain. He has no acute changes in his neurologic status, no new bowel or bladder  symptoms, outside of his low back pain rating to his right lower extremity he has no new questions or concerns. He denies any symptoms in his left lower extremity. denies new numbness/tingling/weakness, denies fevers, chills, sob, cp.      CARY:  22 %  VAS:  6, 6  [id number]:     General physical health: 14   General mental health:  14    Physical Examination:    Gen:  Alert, no acute distress, well nourished, appears stated age    Psych:  Normal affect, nonpressured speech    Musculoskeletal:     Spine:   Stands erect with no list or stoop   Overall nml sagittal alignment   No scoliotic curves grossly evident      L2-3: Hip flexion L and R 5/5 strength         L4:  Knee extension L and R 5/5 strength        L5:  Foot / EHL dorsiflexion L and R 5/5 strength        S1:  Plantarflexion  L and R 5/5 strength   Sensation intact to light touch L3-S1 distribution BLE    Imaging:  Previous MRI from 2016 is reviewed, it does demonstrate multilevel degenerative changes most pronounced at the L4 S1 with with disc flattening, decreased signal on MRI, posterior endplate edema, there is significant paracentral disc herniation at L4 impinging on the right nerve root causing moderate foraminal stenosis at this level, as well as facet hypertrophy and central disc herniation resulting in severe foraminal stenosis on the right L5 nerve root      Assessment:   51 year old male with the followin. Right L4 5, L5-S1 radiculopathy status post transforaminal epidural corticosteroid injection right L5/S1 in 2017 with good albeit short-lived response      Plan:  1. We had an extensive discussion with this patient today regarding further treatment options. He has had good response to transforaminal epidural steroid injection. He did note that this procedure was exquisitely painful and he does not wish to undergo repeat injection. He is looking for more long lasting, definitive relief of his right lower extremity  symptoms. He states that his symptoms are primarily, 90%, isolated to his right posterior thigh leg and into the dorsum of his foot.  In terms of more definitive management, this would involve surgical decompression. We did note that his previous MRI was performed approximately 1 year ago, however the patient notes she's had no significant change in symptoms in the interim therefore, he does not need repeat MRI. We discussed two-level surgical decompression at L4-L5, and L5-S1. We explained that this would be done on an outpatient basis, here in our same-day surgery center. We explained that he would need time off of work following his procedure, for an estimated period 4-6 weeks. We also explained postoperative restrictions including a 10 pound lifting restriction for 6 weeks following surgery. We discussed risks benefits and alternatives decompression including risk of infection, risk of bleeding, risk of injury to blood vessels or nerves. We also discussed risk of continued pain postoperatively as well as need for repeat surgery. Furthermore, we discussed the possibility of fusion in the future. The patient understands. We reviewed imaging with the patient is white. All questions are answered. They're happy with the plan as dictated above area they would like to proceed with surgical decompression, which will be scheduled at their earliest convenience here in our same-day surgical center. Otherwise, they've no new questions or concerns. The patient can continue to be weightbearing as tolerated, activity as tolerated, and follow up on an as-needed basis prior to surgery. He is given contact information for clinic to call with any questions or urgent interim between now and surgery.  Seen and discussed with Dr. Aj West MD MS  Orthopaedic Surgery PGY-4   Pager:  [phone]    Addendum:  I personally saw and evaluated patient with Dr. West, and I agree with findings and plan outlined in the  note.  S> 50/m, seen 1/5/17 for R leg radicular pain 90%, LBP 10%.  (+) good temporary relief with R L5-S1 TFESI.  MRI 09/2016 showed R-sided stenosis L4-5,L5-S1 - both of which may be pinching the R L5 nerve root.  A>  - Right L4-5 and L5-S1 stenosis.  - R L5 radicular pain.  P>  Discussed options.  Surg request placed:  MIS R L4-5 and L5-S1 hemilaminectomy, foraminotomy with R L4-5 diskectomy.  May be done outpatient at [facility].  Discussed with patient rationale, risks, benefits, alternatives.   Discussed postop restrictions, and need to take time off work 4-6 wks.    TT > 25 mins, > 50% CC.  Jem Rocha MD

## 2017-09-07 ENCOUNTER — TELEPHONE (OUTPATIENT)
Dept: ORTHOPEDICS | Facility: CLINIC | Age: 52
End: 2017-09-07

## 2017-10-09 ENCOUNTER — ANESTHESIA EVENT (OUTPATIENT)
Dept: SURGERY | Facility: AMBULATORY SURGERY CENTER | Age: 52
End: 2017-10-09

## 2017-10-10 ENCOUNTER — ANESTHESIA (OUTPATIENT)
Dept: SURGERY | Facility: AMBULATORY SURGERY CENTER | Age: 52
End: 2017-10-10

## 2017-10-10 ENCOUNTER — HOSPITAL ENCOUNTER (OUTPATIENT)
Facility: AMBULATORY SURGERY CENTER | Age: 52
End: 2017-10-10
Attending: ORTHOPAEDIC SURGERY

## 2017-10-10 ENCOUNTER — SURGERY (OUTPATIENT)
Age: 52
End: 2017-10-10

## 2017-10-10 VITALS
WEIGHT: 270 LBS | DIASTOLIC BLOOD PRESSURE: 70 MMHG | RESPIRATION RATE: 16 BRPM | BODY MASS INDEX: 32.88 KG/M2 | SYSTOLIC BLOOD PRESSURE: 113 MMHG | HEIGHT: 76 IN | TEMPERATURE: 97.4 F | HEART RATE: 72 BPM | OXYGEN SATURATION: 94 %

## 2017-10-10 DIAGNOSIS — Z98.890 S/P LAMINECTOMY: Primary | ICD-10-CM

## 2017-10-10 RX ORDER — OXYCODONE HYDROCHLORIDE 5 MG/1
5-10 TABLET ORAL EVERY 4 HOURS PRN
Qty: 50 TABLET | Refills: 0 | Status: SHIPPED | OUTPATIENT
Start: 2017-10-10 | End: 2021-08-03

## 2017-10-10 RX ORDER — FENTANYL CITRATE 50 UG/ML
25-50 INJECTION, SOLUTION INTRAMUSCULAR; INTRAVENOUS
Status: DISCONTINUED | OUTPATIENT
Start: 2017-10-10 | End: 2017-10-11 | Stop reason: HOSPADM

## 2017-10-10 RX ORDER — LIDOCAINE 40 MG/G
CREAM TOPICAL
Status: DISCONTINUED | OUTPATIENT
Start: 2017-10-10 | End: 2017-10-10 | Stop reason: HOSPADM

## 2017-10-10 RX ORDER — LIDOCAINE HYDROCHLORIDE 20 MG/ML
INJECTION, SOLUTION INFILTRATION; PERINEURAL PRN
Status: DISCONTINUED | OUTPATIENT
Start: 2017-10-10 | End: 2017-10-10

## 2017-10-10 RX ORDER — GABAPENTIN 300 MG/1
300 CAPSULE ORAL ONCE
Status: COMPLETED | OUTPATIENT
Start: 2017-10-10 | End: 2017-10-10

## 2017-10-10 RX ORDER — EPHEDRINE SULFATE 50 MG/ML
INJECTION, SOLUTION INTRAMUSCULAR; INTRAVENOUS; SUBCUTANEOUS PRN
Status: DISCONTINUED | OUTPATIENT
Start: 2017-10-10 | End: 2017-10-10

## 2017-10-10 RX ORDER — AMOXICILLIN 250 MG
1-2 CAPSULE ORAL 2 TIMES DAILY
Qty: 30 TABLET | Refills: 0 | Status: SHIPPED | OUTPATIENT
Start: 2017-10-10 | End: 2021-08-03

## 2017-10-10 RX ORDER — SODIUM CHLORIDE, SODIUM LACTATE, POTASSIUM CHLORIDE, CALCIUM CHLORIDE 600; 310; 30; 20 MG/100ML; MG/100ML; MG/100ML; MG/100ML
INJECTION, SOLUTION INTRAVENOUS CONTINUOUS
Status: DISCONTINUED | OUTPATIENT
Start: 2017-10-10 | End: 2017-10-11 | Stop reason: HOSPADM

## 2017-10-10 RX ORDER — BUPIVACAINE HYDROCHLORIDE AND EPINEPHRINE 2.5; 5 MG/ML; UG/ML
INJECTION, SOLUTION INFILTRATION; PERINEURAL PRN
Status: DISCONTINUED | OUTPATIENT
Start: 2017-10-10 | End: 2017-10-10 | Stop reason: HOSPADM

## 2017-10-10 RX ORDER — FENTANYL CITRATE 50 UG/ML
INJECTION, SOLUTION INTRAMUSCULAR; INTRAVENOUS PRN
Status: DISCONTINUED | OUTPATIENT
Start: 2017-10-10 | End: 2017-10-10

## 2017-10-10 RX ORDER — MEPERIDINE HYDROCHLORIDE 25 MG/ML
12.5 INJECTION INTRAMUSCULAR; INTRAVENOUS; SUBCUTANEOUS
Status: DISCONTINUED | OUTPATIENT
Start: 2017-10-10 | End: 2017-10-11 | Stop reason: HOSPADM

## 2017-10-10 RX ORDER — ONDANSETRON 4 MG/1
4 TABLET, ORALLY DISINTEGRATING ORAL EVERY 30 MIN PRN
Status: DISCONTINUED | OUTPATIENT
Start: 2017-10-10 | End: 2017-10-11 | Stop reason: HOSPADM

## 2017-10-10 RX ORDER — NALOXONE HYDROCHLORIDE 0.4 MG/ML
.1-.4 INJECTION, SOLUTION INTRAMUSCULAR; INTRAVENOUS; SUBCUTANEOUS
Status: DISCONTINUED | OUTPATIENT
Start: 2017-10-10 | End: 2017-10-11 | Stop reason: HOSPADM

## 2017-10-10 RX ORDER — ONDANSETRON 2 MG/ML
INJECTION INTRAMUSCULAR; INTRAVENOUS PRN
Status: DISCONTINUED | OUTPATIENT
Start: 2017-10-10 | End: 2017-10-10

## 2017-10-10 RX ORDER — HYDRALAZINE HYDROCHLORIDE 20 MG/ML
2.5-5 INJECTION INTRAMUSCULAR; INTRAVENOUS EVERY 10 MIN PRN
Status: DISCONTINUED | OUTPATIENT
Start: 2017-10-10 | End: 2017-10-10 | Stop reason: HOSPADM

## 2017-10-10 RX ORDER — CEFAZOLIN SODIUM 1 G/50ML
3 SOLUTION INTRAVENOUS
Status: COMPLETED | OUTPATIENT
Start: 2017-10-10 | End: 2017-10-10

## 2017-10-10 RX ORDER — ONDANSETRON 2 MG/ML
4 INJECTION INTRAMUSCULAR; INTRAVENOUS EVERY 30 MIN PRN
Status: DISCONTINUED | OUTPATIENT
Start: 2017-10-10 | End: 2017-10-11 | Stop reason: HOSPADM

## 2017-10-10 RX ORDER — HYDROXYZINE HYDROCHLORIDE 25 MG/1
25 TABLET, FILM COATED ORAL
Status: DISCONTINUED | OUTPATIENT
Start: 2017-10-10 | End: 2017-10-11 | Stop reason: HOSPADM

## 2017-10-10 RX ORDER — KETOROLAC TROMETHAMINE 30 MG/ML
INJECTION, SOLUTION INTRAMUSCULAR; INTRAVENOUS PRN
Status: DISCONTINUED | OUTPATIENT
Start: 2017-10-10 | End: 2017-10-10

## 2017-10-10 RX ORDER — ACETAMINOPHEN 325 MG/1
650 TABLET ORAL
Status: DISCONTINUED | OUTPATIENT
Start: 2017-10-10 | End: 2017-10-11 | Stop reason: HOSPADM

## 2017-10-10 RX ORDER — ACETAMINOPHEN 325 MG/1
975 TABLET ORAL ONCE
Status: COMPLETED | OUTPATIENT
Start: 2017-10-10 | End: 2017-10-10

## 2017-10-10 RX ORDER — OXYCODONE HYDROCHLORIDE 5 MG/1
5-10 TABLET ORAL
Status: DISCONTINUED | OUTPATIENT
Start: 2017-10-10 | End: 2017-10-11 | Stop reason: HOSPADM

## 2017-10-10 RX ORDER — SODIUM CHLORIDE, SODIUM LACTATE, POTASSIUM CHLORIDE, CALCIUM CHLORIDE 600; 310; 30; 20 MG/100ML; MG/100ML; MG/100ML; MG/100ML
INJECTION, SOLUTION INTRAVENOUS CONTINUOUS
Status: DISCONTINUED | OUTPATIENT
Start: 2017-10-10 | End: 2017-10-10 | Stop reason: HOSPADM

## 2017-10-10 RX ORDER — CEFAZOLIN SODIUM 1 G/3ML
1 INJECTION, POWDER, FOR SOLUTION INTRAMUSCULAR; INTRAVENOUS SEE ADMIN INSTRUCTIONS
Status: DISCONTINUED | OUTPATIENT
Start: 2017-10-10 | End: 2017-10-10 | Stop reason: HOSPADM

## 2017-10-10 RX ORDER — PROPOFOL 10 MG/ML
INJECTION, EMULSION INTRAVENOUS PRN
Status: DISCONTINUED | OUTPATIENT
Start: 2017-10-10 | End: 2017-10-10

## 2017-10-10 RX ORDER — DEXAMETHASONE SODIUM PHOSPHATE 10 MG/ML
INJECTION, SOLUTION INTRAMUSCULAR; INTRAVENOUS PRN
Status: DISCONTINUED | OUTPATIENT
Start: 2017-10-10 | End: 2017-10-10

## 2017-10-10 RX ORDER — PROPOFOL 10 MG/ML
INJECTION, EMULSION INTRAVENOUS CONTINUOUS PRN
Status: DISCONTINUED | OUTPATIENT
Start: 2017-10-10 | End: 2017-10-10

## 2017-10-10 RX ORDER — HYDROXYZINE PAMOATE 25 MG/1
25 CAPSULE ORAL 4 TIMES DAILY PRN
Qty: 50 CAPSULE | Refills: 0 | Status: SHIPPED | OUTPATIENT
Start: 2017-10-10 | End: 2021-08-03

## 2017-10-10 RX ORDER — ONDANSETRON 4 MG/1
4 TABLET, ORALLY DISINTEGRATING ORAL
Status: DISCONTINUED | OUTPATIENT
Start: 2017-10-10 | End: 2017-10-11 | Stop reason: HOSPADM

## 2017-10-10 RX ORDER — FENTANYL CITRATE 50 UG/ML
25-50 INJECTION, SOLUTION INTRAMUSCULAR; INTRAVENOUS EVERY 5 MIN PRN
Status: DISCONTINUED | OUTPATIENT
Start: 2017-10-10 | End: 2017-10-10 | Stop reason: HOSPADM

## 2017-10-10 RX ORDER — ACETAMINOPHEN 325 MG/1
650 TABLET ORAL EVERY 4 HOURS PRN
Qty: 100 TABLET | Refills: 0 | Status: SHIPPED | OUTPATIENT
Start: 2017-10-10 | End: 2021-08-03

## 2017-10-10 RX ADMIN — FENTANYL CITRATE 50 MCG: 50 INJECTION, SOLUTION INTRAMUSCULAR; INTRAVENOUS at 12:40

## 2017-10-10 RX ADMIN — Medication 50 MG: at 12:40

## 2017-10-10 RX ADMIN — EPHEDRINE SULFATE 10 MG: 50 INJECTION, SOLUTION INTRAMUSCULAR; INTRAVENOUS; SUBCUTANEOUS at 13:19

## 2017-10-10 RX ADMIN — SODIUM CHLORIDE, SODIUM LACTATE, POTASSIUM CHLORIDE, CALCIUM CHLORIDE: 600; 310; 30; 20 INJECTION, SOLUTION INTRAVENOUS at 11:20

## 2017-10-10 RX ADMIN — PROPOFOL 200 MG: 10 INJECTION, EMULSION INTRAVENOUS at 12:40

## 2017-10-10 RX ADMIN — PROPOFOL 80 MG: 10 INJECTION, EMULSION INTRAVENOUS at 15:00

## 2017-10-10 RX ADMIN — GABAPENTIN 300 MG: 300 CAPSULE ORAL at 11:20

## 2017-10-10 RX ADMIN — DEXAMETHASONE SODIUM PHOSPHATE 4 MG: 10 INJECTION, SOLUTION INTRAMUSCULAR; INTRAVENOUS at 13:20

## 2017-10-10 RX ADMIN — CEFAZOLIN SODIUM 3 G: 1 SOLUTION INTRAVENOUS at 12:37

## 2017-10-10 RX ADMIN — EPHEDRINE SULFATE 5 MG: 50 INJECTION, SOLUTION INTRAMUSCULAR; INTRAVENOUS; SUBCUTANEOUS at 13:41

## 2017-10-10 RX ADMIN — PROPOFOL 100 MG: 10 INJECTION, EMULSION INTRAVENOUS at 12:42

## 2017-10-10 RX ADMIN — ACETAMINOPHEN 975 MG: 325 TABLET ORAL at 11:20

## 2017-10-10 RX ADMIN — LIDOCAINE HYDROCHLORIDE 100 MG: 20 INJECTION, SOLUTION INFILTRATION; PERINEURAL at 12:40

## 2017-10-10 RX ADMIN — FENTANYL CITRATE 50 MCG: 50 INJECTION, SOLUTION INTRAMUSCULAR; INTRAVENOUS at 13:01

## 2017-10-10 RX ADMIN — Medication 30 MG: at 13:09

## 2017-10-10 RX ADMIN — PROPOFOL 100 MCG/KG/MIN: 10 INJECTION, EMULSION INTRAVENOUS at 12:54

## 2017-10-10 RX ADMIN — ONDANSETRON 4 MG: 2 INJECTION INTRAMUSCULAR; INTRAVENOUS at 14:46

## 2017-10-10 RX ADMIN — EPHEDRINE SULFATE 10 MG: 50 INJECTION, SOLUTION INTRAMUSCULAR; INTRAVENOUS; SUBCUTANEOUS at 14:05

## 2017-10-10 RX ADMIN — KETOROLAC TROMETHAMINE 30 MG: 30 INJECTION, SOLUTION INTRAMUSCULAR; INTRAVENOUS at 14:51

## 2017-10-10 RX ADMIN — SODIUM CHLORIDE, SODIUM LACTATE, POTASSIUM CHLORIDE, CALCIUM CHLORIDE: 600; 310; 30; 20 INJECTION, SOLUTION INTRAVENOUS at 14:47

## 2017-10-10 RX ADMIN — CEFAZOLIN SODIUM 1 G: 1 INJECTION, POWDER, FOR SOLUTION INTRAMUSCULAR; INTRAVENOUS at 14:19

## 2017-10-10 RX ADMIN — BUPIVACAINE HYDROCHLORIDE AND EPINEPHRINE 15 ML: 2.5; 5 INJECTION, SOLUTION INFILTRATION; PERINEURAL at 14:50

## 2017-10-10 NOTE — ANESTHESIA PREPROCEDURE EVALUATION
Anesthesia Evaluation     .             ROS/MED HX    ENT/Pulmonary:  - neg pulmonary ROS     Neurologic:  - neg neurologic ROS     Cardiovascular:  - neg cardiovascular ROS       METS/Exercise Tolerance:     Hematologic:  - neg hematologic  ROS       Musculoskeletal: Comment: Lumbar foraminal stenosis   (+) , , other musculoskeletal-       GI/Hepatic:  - neg GI/hepatic ROS       Renal/Genitourinary:  - ROS Renal section negative       Endo:  - neg endo ROS       Psychiatric:  - neg psychiatric ROS       Infectious Disease:  - neg infectious disease ROS       Malignancy:      - no malignancy   Other:    - neg other ROS                 Physical Exam  Normal systems: dental    Airway   Mallampati: II  TM distance: >3 FB  Neck ROM: full    Dental     Cardiovascular   Rhythm and rate: regular and normal      Pulmonary    breath sounds clear to auscultation                    Anesthesia Plan      History & Physical Review  History and physical reviewed and following examination; no interval change.    ASA Status:  2 .    NPO Status:  > 8 hours    Plan for General and ETT with Intravenous induction. Maintenance will be Inhalation.    PONV prophylaxis:  Ondansetron (or other 5HT-3) and Dexamethasone or Solumedrol       Postoperative Care  Postoperative pain management:  IV analgesics and Oral pain medications.      Consents  Anesthetic plan, risks, benefits and alternatives discussed with:  Patient..                          .

## 2017-10-10 NOTE — IP AVS SNAPSHOT
MRN:[id number]                      After Visit Summary   10/10/2017    Tyler Barreto    MRN: [id number]           Thank you!     Thank you for choosing [facility] for your care. Our goal is always to provide you with excellent care. Hearing back from our patients is one way we can continue to improve our services. Please take a few minutes to complete the written survey that you may receive in the mail after you visit with us. Thank you!        Patient Information     Date Of Birth          1965        About your hospital stay     You were admitted on:  October 10, 2017 [facility]    You were discharged on:  October 10, 2017       Who to Call     For medical emergencies, please call 911.  For non-urgent questions about your medical care, please call your primary care provider or clinic, [phone]  For questions related to your surgery, please call your surgery clinic        Attending Provider     Provider Specialty    Jem Rocha MD Orthopaedic Surgery       Primary Care Provider Office Phone # Fax #    Taras CINDY Marina [phone]      After Care Instructions     Discharge Instructions       Review outpatient procedure discharge instructions with patient as directed by Provider            Discharge Instructions - Lifting Limit (specify)       Lifting limit  10 pounds until seen at Post-op follow up appointment.            Dressing Change        You may start daily dressing changes in 48-72 hours.  Keep incision covered and dry for 7-10 days.            Ice to affected area       Ice pack to surgical site every 15 minutes per hour for 24 hours            Return to clinic       Return to clinic in 6 weeks            Shower        Cover dressing if dressing is not going to be changed today            Weight bearing - As tolerated                 Your next 10 appointments already scheduled     Dec 07,  2017  9:40 AM CST   (Arrive by 9:10 AM)   RETURN SPINE with Jem Rocha MD   [facility] ([facility])    [address] [place] [id number] [phone]            Jan 16, 2018  8:40 AM CST   (Arrive by 8:10 AM)   RETURN SPINE with Jem Rocha MD   [facility] ([facility])    [address] [place] [id number] [phone]              Further instructions from your care team       [facility]  Home Care Following Anesthesia  For 24 hours after surgery:  1. Get plenty of rest.  A responsible adult must stay with you for at least 24 hours after you leave the surgery center.  2. Do not drive or use heavy equipment.  If you have weakness or tingling, don't drive or use heavy equipment until this feeling goes away.   3. Do not drink alcohol.   4. Avoid strenuous or risky activities.  Ask for help when climbing stairs.  5. You may feel lightheaded.  IF so, sit for a few minutes before standing.  Have someone help you get up.   6. If you have nausea (feel sick to your stomach): Drink only clear liquids such as apple juice, ginger ale, broth or 7-Up.  Rest may also help.  Be sure to drink enough fluids.  Move to a regular diet as you feel able.   7. You may have a slight fever.  Call the doctor if your fever is over 100 F (37.7 C) (taken under the tongue) or lasts longer than 24 hours.  8. You may have a dry mouth, a sore throat, muscle aches or trouble sleeping. These should go away after 24 hours.  9. Do not make important or legal decisions.  Tips for taking pain medications  To get the best pain relief possible, remember these points:    Take pain medications as directed, before pain becomes severe.    Pain medication can upset your stomach: taking it with food may help.    Constipation is a common side effect of pain  medication. Drink plenty of  fluids.    Eat foods high in fiber. Take a stool softener if recommended by your doctor or pharmacist.    Do not drink alcohol, drive or operate machinery while taking pain medications.    Ask about other ways to control pain, such as with heat, ice or relaxation.    Tylenol/Acetaminophen Consumption  To help encourage the safe use of acetaminophen, the makers of TYLENOL  have lowered the maximum daily dose for single-ingredient Extra Strength TYLENOL  (acetaminophen) products sold in the [place] from 8 pills per day (4,000 mg) to 6 pills per day (3,000 mg). The dosing interval has also changed from 2 pills every 4-6 hours to 2 pills every 6 hours.    If you feel your pain relief is insufficient, you may take Tylenol/Acetaminophen in addition to your narcotic pain medication.     Be careful not to exceed 3,000 mg of Tylenol/Acetaminophen in a 24 hour period from all sources.    If you are taking extra strength Tylenol/acetaminophen (500 mg), the maximum dose is 6 tablets in 24 hours.    If you are taking regular strength acetaminophen (325 mg), the maximum dose is 9 tablets in 24 hours.    Call a doctor for any of the followin. Signs of infection (fever, growing tenderness at the surgery site, a large amount of drainage or bleeding, severe pain, foul-smelling drainage, redness, swelling).  2. It has been over 8 to 10 hours since surgery and you are still not able to urinate (pass water).  3. Headache for over 24 hours.  Your doctor is:       Dr. Jem Rocha, Orthopaedics: [phone]               Or dial [phone] and ask for the resident on call for:  Orthopaedics  For emergency care, call the:  [facility] Emergency Department: [phone] (TTY for hearing impaired: [phone])                Pending Results     No orders found from 10/8/2017 to 10/11/2017.            Admission Information     Date & Time Provider Department Dept. Phone    10/10/2017 Jem Rocha  "MD [facility] [phone]      Your Vitals Were     Blood Pressure Pulse Temperature Respirations Height Weight    141/87 72 98.2  F (36.8  C) (Oral) 18 1.93 m (6' 4\") 122.5 kg (270 lb)    Pulse Oximetry BMI (Body Mass Index)                99% 32.87 kg/m2          myEDmatch Information     myEDmatch gives you secure access to your electronic health record. If you see a primary care provider, you can also send messages to your care team and make appointments. If you have questions, please call your primary care clinic.  If you do not have a primary care provider, please call [phone] and they will assist you.      myEDmatch is an electronic gateway that provides easy, online access to your medical records. With myEDmatch, you can request a clinic appointment, read your test results, renew a prescription or communicate with your care team.     To access your existing account, please contact your [facility] or call [phone] for assistance.        Care EveryWhere ID     This is your Care EveryWhere ID. This could be used by other organizations to access your [facility] medical records  [id number]        Equal Access to Services     DIOGENES SPIVEY AH: Hadii jose daniel Bell, waarvind chisholm, qabello kaalmamedina peralta, sadie de la cruz. So [facility] [phone].    ATENCIÓN: Si habla español, tiene a llanos disposición servicios gratuitos de asistencia lingüística. Brandy al [phone].    We comply with applicable federal civil rights laws and [place] laws. We do not discriminate on the basis of race, color, national origin, age, disability, sex, sexual orientation, or gender identity.               Review of your medicines      START taking        Dose / Directions    acetaminophen 325 MG tablet   Commonly known as:  TYLENOL   Used for:  S/P laminectomy        Dose:  650 mg   Take 2 tablets (650 mg) by mouth every 4 hours as needed " for other (mild pain)   Quantity:  100 tablet   Refills:  0       hydrOXYzine 25 MG capsule   Commonly known as:  VISTARIL   Used for:  S/P laminectomy        Dose:  25 mg   Take 1 capsule (25 mg) by mouth 4 times daily as needed for itching   Quantity:  50 capsule   Refills:  0       oxyCODONE 5 MG IR tablet   Commonly known as:  ROXICODONE   Used for:  S/P laminectomy        Dose:  5-10 mg   Take 1-2 tablets (5-10 mg) by mouth every 4 hours as needed for pain or other (Moderate to Severe)   Quantity:  50 tablet   Refills:  0       senna-docusate 8.6-50 MG per tablet   Commonly known as:  SENOKOT-S;PERICOLACE   Used for:  S/P laminectomy        Dose:  1-2 tablet   Take 1-2 tablets by mouth 2 times daily Take while on oral narcotics to prevent or treat constipation.   Quantity:  30 tablet   Refills:  0         CONTINUE these medicines which have NOT CHANGED        Dose / Directions    traZODone 50 MG tablet   Commonly known as:  DESYREL   Used for:  Insomnia, unspecified        Dose:   mg   Take 1-2 tablets ( mg) by mouth At Bedtime   Quantity:  90 tablet   Refills:  1            Where to get your medicines      Some of these will need a paper prescription and others can be bought over the counter. Ask your nurse if you have questions.     Bring a paper prescription for each of these medications     acetaminophen 325 MG tablet    hydrOXYzine 25 MG capsule    oxyCODONE 5 MG IR tablet    senna-docusate 8.6-50 MG per tablet                Protect others around you: Learn how to safely use, store and throw away your medicines at www.disposemymeds.org.             Medication List: This is a list of all your medications and when to take them. Check marks below indicate your daily home schedule. Keep this list as a reference.      Medications           Morning Afternoon Evening Bedtime As Needed    acetaminophen 325 MG tablet   Commonly known as:  TYLENOL   Take 2 tablets (650 mg) by mouth every 4 hours as  needed for other (mild pain)   Last time this was given:  975 mg on 10/10/2017 11:20 AM                                hydrOXYzine 25 MG capsule   Commonly known as:  VISTARIL   Take 1 capsule (25 mg) by mouth 4 times daily as needed for itching                                oxyCODONE 5 MG IR tablet   Commonly known as:  ROXICODONE   Take 1-2 tablets (5-10 mg) by mouth every 4 hours as needed for pain or other (Moderate to Severe)                                senna-docusate 8.6-50 MG per tablet   Commonly known as:  SENOKOT-S;PERICOLACE   Take 1-2 tablets by mouth 2 times daily Take while on oral narcotics to prevent or treat constipation.                                traZODone 50 MG tablet   Commonly known as:  DESYREL   Take 1-2 tablets ( mg) by mouth At Bedtime

## 2017-10-10 NOTE — ANESTHESIA CARE TRANSFER NOTE
Patient: Tyler Barreto    Procedure(s):  Right Minimally Invasive Hemilaminectomy Lumbar 4-5, Lumbar 5-Sacral 1 - Wound Class: I-Clean    Diagnosis: Right Lumbar 5-Sacral 1 Foraminal Stenosis   Diagnosis Additional Information: No value filed.    Anesthesia Type:   General, ETT     Note:  Airway :Face Mask  Patient transferred to:PACU  Comments: 117/73 97% 97.9F - 90- 14Handoff Report: Identifed the Patient, Identified the Reponsible Provider, Reviewed the pertinent medical history, Discussed the surgical course, Reviewed Intra-OP anesthesia mangement and issues during anesthesia, Set expectations for post-procedure period and Allowed opportunity for questions and acknowledgement of understanding      Vitals: (Last set prior to Anesthesia Care Transfer)    CRNA VITALS  10/10/2017 1444 - 10/10/2017 1518      10/10/2017             Pulse: 98    SpO2: 97 %    Resp Rate (observed): (!)  6    Resp Rate (set): 10                Electronically Signed By: BETTY Rodriguez CRNA  October 10, 2017  3:18 PM

## 2017-10-10 NOTE — IP AVS SNAPSHOT
[facility]    [address] [place] [address]    Phone:  [phone]    Fax:  [phone]                                       After Visit Summary   10/10/2017    Tyler Barreto    MRN: [id number]           After Visit Summary Signature Page     I have received my discharge instructions, and my questions have been answered. I have discussed any challenges I see with this plan with the nurse or doctor.    ..........................................................................................................................................  Patient/Patient Representative Signature      ..........................................................................................................................................  Patient Representative Print Name and Relationship to Patient    ..................................................               ................................................  Date                                            Time    ..........................................................................................................................................  Reviewed by Signature/Title    ...................................................              ..............................................  Date                                                            Time

## 2017-10-10 NOTE — ANESTHESIA POSTPROCEDURE EVALUATION
Patient: Tyler Barreto    Procedure(s):  Right Minimally Invasive Hemilaminectomy Lumbar 4-5, Lumbar 5-Sacral 1 - Wound Class: I-Clean    Diagnosis:Right Lumbar 5-Sacral 1 Foraminal Stenosis   Diagnosis Additional Information: No value filed.    Anesthesia Type:  General, ETT    Note:  Anesthesia Post Evaluation    Patient location during evaluation: PACU  Patient participation: Able to fully participate in evaluation  Level of consciousness: sleepy but conscious and responsive to verbal stimuli  Pain management: adequate  Airway patency: patent  Cardiovascular status: acceptable  Respiratory status: acceptable  Hydration status: acceptable     Anesthetic complications: None          Last vitals:  Vitals:    10/10/17 1530 10/10/17 1545 10/10/17 1605   BP: 120/80 115/73 113/70   Pulse:      Resp: 14 14 16   Temp: 36.7  C (98.1  F) 36.4  C (97.5  F) 36.3  C (97.4  F)   SpO2: 96% 94% 94%         Electronically Signed By: Beatriz Goddard MD  October 10, 2017  5:50 PM

## 2017-10-10 NOTE — DISCHARGE INSTRUCTIONS
[facility]  Home Care Following Anesthesia  For 24 hours after surgery:  1. Get plenty of rest.  A responsible adult must stay with you for at least 24 hours after you leave the surgery center.  2. Do not drive or use heavy equipment.  If you have weakness or tingling, don't drive or use heavy equipment until this feeling goes away.   3. Do not drink alcohol.   4. Avoid strenuous or risky activities.  Ask for help when climbing stairs.  5. You may feel lightheaded.  IF so, sit for a few minutes before standing.  Have someone help you get up.   6. If you have nausea (feel sick to your stomach): Drink only clear liquids such as apple juice, ginger ale, broth or 7-Up.  Rest may also help.  Be sure to drink enough fluids.  Move to a regular diet as you feel able.   7. You may have a slight fever.  Call the doctor if your fever is over 100 F (37.7 C) (taken under the tongue) or lasts longer than 24 hours.  8. You may have a dry mouth, a sore throat, muscle aches or trouble sleeping. These should go away after 24 hours.  9. Do not make important or legal decisions.  Tips for taking pain medications  To get the best pain relief possible, remember these points:    Take pain medications as directed, before pain becomes severe.    Pain medication can upset your stomach: taking it with food may help.    Constipation is a common side effect of pain medication. Drink plenty of  fluids.    Eat foods high in fiber. Take a stool softener if recommended by your doctor or pharmacist.    Do not drink alcohol, drive or operate machinery while taking pain medications.    Ask about other ways to control pain, such as with heat, ice or relaxation.    Tylenol/Acetaminophen Consumption  To help encourage the safe use of acetaminophen, the makers of TYLENOL  have lowered the maximum daily dose for single-ingredient Extra Strength TYLENOL  (acetaminophen) products sold in the [place] from 8 pills per day  (4,000 mg) to 6 pills per day (3,000 mg). The dosing interval has also changed from 2 pills every 4-6 hours to 2 pills every 6 hours.    If you feel your pain relief is insufficient, you may take Tylenol/Acetaminophen in addition to your narcotic pain medication.     Be careful not to exceed 3,000 mg of Tylenol/Acetaminophen in a 24 hour period from all sources.    If you are taking extra strength Tylenol/acetaminophen (500 mg), the maximum dose is 6 tablets in 24 hours.    If you are taking regular strength acetaminophen (325 mg), the maximum dose is 9 tablets in 24 hours.    Call a doctor for any of the followin. Signs of infection (fever, growing tenderness at the surgery site, a large amount of drainage or bleeding, severe pain, foul-smelling drainage, redness, swelling).  2. It has been over 8 to 10 hours since surgery and you are still not able to urinate (pass water).  3. Headache for over 24 hours.  Your doctor is:       Dr. Jem Rocha, Orthopaedics: [phone]               Or dial [phone] and ask for the resident on call for:  Orthopaedics  For emergency care, call the:  [facility] Emergency Department: [phone] (TTY for hearing impaired: [phone])

## 2017-10-10 NOTE — OP NOTE
Surgeon:  Jem Rocha MD  Asst:  Shaista Higgins PA-C.  No qualified resident assist available.  Ms. Higgins participated in all aspects of the procedure, including patient positioning, exposure, level identification, decompression (hemilaminectomy) and wound closure.      Preop Dx:  - Right lateral recess and foraminal stenosis L4-5 and L5-S1.  - Right L5/S1 radicular pain     Postop Dx:  Same     Procedures:  1.  MIS right hemilaminectomy, medial facetectomy and foraminotomy L4-5 with decompression of the traversing R L5 and exiting R L4 nerve roots.  2.  MIS right hemilaminectomy, medial facetectomy and foraminotomy L5-S1, with decompression of the traversing R S1 and exiting R L5 nerve roots.  3.  Use of operating microscope.     Anesthesia:  General endotracheal.  Local anesthetic:  0.25% marcaine + epinephrine.  Total 30cc used.  EBL:  10 cc  Complications:  None  Equipment used:  Medtronic METRWonderflow MIS system, 22mm tubular retractors: L4-5 and L5-S1 = 6cm length.     Indications:  51/m, who presented with chronic right leg radicular pain, no pain on the left.  Tried and failed nonop treatment.  MRI showed R-sided stenosis L4-5 and L5-S1.  I offered surgery in form of MIS decompression at both levels.  He consented after thorough discussion of rationale, risks, benefits and alternatives.     Details:  Properly identified in preop, site marked, consent signed.  Wheeled to OR.  Brief earlier performed.  General anesthesia administered.  3gm IV Ancef given.  No stubbs.  Positioned prone on Trios table.  Lumbar region squared off, prepped with Chloraprep, draped in sterile fashion.  Surgical timeout performed.     G18 spinal needle inserted L paraspinal approx L5-S1 level.  Lateral c-arm image showed needle just below L5-S1.  I drew planned skin incision 0.5cm to right of midline centered at L4-S1.  Skin infiltrated with 10cc anesthetic.  4cm skin incision made.  Fascia incised to right of spinous process.   Sequential dilation down to lamina performed, placed 22mm x 6cm tubular retractor, anchored to bed using snake arm.  Soft tissue cleared off bone; angled curette placed beneath inferior edge of presumed L5 lamina.  Lateral image showed retractor and curette at L5-S1 level, confirmed by radiologist; confirmatory timeout performed.  Microscope brought in.  Right hemilaminectomy performed using sarah and completed with 2 and 3mm kerrisons.  Ligamentum flavum removed, exposing underlying dural sac.  Medial facetectomy performed using kerrisons, carried to medial aspect of S1 pedicle.  This unroofed the lateral recess and traversing nerve root.  I palpated the foramen, noted tightness.  Foraminal decompression performed using curved 2 mm Kerrison.  Confirmed adequate decompression using flores probe.  I followed both the exiting and traversing nerve roots and confirmed that these are free of pressure.  I palpated the disc; no extruded fragments; I did not think the disc was causing undue pressure on the nerves and deemed a diskectomy was not necessary.  Irrigation performed.  Hemostasis observed using bipolar, surgiflo and cottonoids.  ~ 3cc anesthetic injected onto epidural space.  Retractor removed.    Attention turned to L4-5 level; fascial incision extended proximally, still to right of spinous process.  Sequential dilation performed down to R hemilamina.  6cm tubular retractor placed, anchored to bed using snake arm.  Soft tissue cleared off bone.  Angled curet hooked beneath presumed L4 lamina.  Lateral image showed retractor and curette at L4-5 level; confirmatory timeout performed.  Right hemilaminectomy performed using sarah; completed with 2 and 3mm kerrisons.  Ligamentum flavum removed, exposing underlying dural sac.  Medial facetectomy performed using kerrisons, carried to medial aspect of L5 pedicle.  I noted severe right lateral recess stenosis secondary to the medial superior process almost touching the  disc; the space was very tight that it was very difficult to get our kerrisons in to resect the medial facet.  We were eventually able to do so and resect the medial facet.  I followed the traversing L5 nerve root and also resected the leading edge of the L5 lamina to fully decompress this nerve.  We proceeded with foraminal decompression using the curved 2 mm Kerrison.  Confirmed adequate decompression using flores probe.  I followed both the exiting and traversing nerve roots and confirmed that these are free of pressure.  I inspected the disc although bulging on the MRI, there was no focal protrusion, no annular defect, no extruded fragment.  I tried performing an oblique annulotomy using #15 blade; however, the annulus was calcified at this area.  I then decided not to proceed with diskectomy.  Irrigation performed.  Hemostasis observed using bipolar, surgiflo and cottonoids.  ~ 3cc anesthetic injected onto epidural space.  Retractor and microscope removed.  Closure performed:  Vicryl 0 for fascia, vicryl 2-0 for subcutaneous tissue in 2 layers, monocryl 3-0 for skin.  Secureseal and primapore dressing applied.  Tolerated well.  Turned supine, taken off general anesthetic, transferred to PACU in satisfactory condition.     Postop:  Home today after brief PACU stay, with home pain meds.  No lifting > 10 lbs, no excessive bending / twisting of low back x 6 weeks.  RTC 6 wks with lumbar ap-lat x-rays.

## 2017-10-10 NOTE — BRIEF OP NOTE
Date: October 10, 2017    Preoperative Diagnosis: Right Lumbar 5-Sacral 1 Foraminal Stenosis      Postoperative Diagnosis: same    Procedure: Procedure(s):  Right Minimally Invasive Hemilaminectomy Lumbar 4-5, Lumbar 5-Sacral 1 - Wound Class: I-Clean    Surgeon: Dr. Rocha     Resident: None available    PA: Shaista Higgins     Anesthesia: General     Estimated Blood Loss: 10 cc    Specimen: none       Complications: none      Plan:  WBAT  ADAT  Pain control per orders  Bowel prophylaxis  DVT prophylaxis: mechanical only  Disposition: home today    I assisted with positioning, prepping and draping, retractor placement and closure.    Shaista Higgins PA-C  Pager [phone]

## 2017-11-24 ASSESSMENT — ENCOUNTER SYMPTOMS
MYALGIAS: 1
STIFFNESS: 1
MUSCLE CRAMPS: 1
MUSCLE WEAKNESS: 1
DECREASED CONCENTRATION: 0
DEPRESSION: 1
NERVOUS/ANXIOUS: 0
JOINT SWELLING: 0
BACK PAIN: 1
NECK PAIN: 1
PANIC: 0
ARTHRALGIAS: 0
INSOMNIA: 1

## 2017-12-05 DIAGNOSIS — M51.26 DISPLACEMENT OF LUMBAR INTERVERTEBRAL DISC WITHOUT MYELOPATHY: Primary | ICD-10-CM

## 2017-12-07 ENCOUNTER — OFFICE VISIT (OUTPATIENT)
Dept: ORTHOPEDICS | Facility: CLINIC | Age: 52
End: 2017-12-07

## 2017-12-07 VITALS — HEIGHT: 76 IN | BODY MASS INDEX: 32.88 KG/M2 | WEIGHT: 270 LBS

## 2017-12-07 DIAGNOSIS — Z98.890 S/P SPINAL SURGERY: Primary | ICD-10-CM

## 2017-12-07 NOTE — NURSING NOTE
"Reason For Visit:   Chief Complaint   Patient presents with     Surgical Followup     DOS 10/10/17 S/P Right Minimally Invasive Hemilaminectomy Lumbar 4-5, Lumbar 5-Sacral 1     Primary MD: Taras Marina  Ref. MD: Established     ?  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  Work status?  Full time.      Date of injury: none  Type of injury: none.      Date of surgery: 10/10/17   Type of surgery: Right Minimally Invasive Hemilaminectomy Lumbar 4-5, Lumbar 5-Sacral 1]        Smoker: No  Request smoking cessation information: No      Ht 1.93 m (6' 4\")  Wt 122.5 kg (270 lb)  BMI 32.87 kg/m2    Pain Assessment  Patient Currently in Pain: Yes  0-10 Pain Scale: 1  Primary Pain Location: Back  Pain Orientation: Lower  Pain Descriptors: Discomfort    Oswestry (CARY) Questionnaire    OSWESTRY DISABILITY INDEX 11/24/2017   Section 1 - Pain intensity 1   Section 2 - Personal care (washing, dressing, etc.)  0   Section 3 - Lifting 3   Section 4 - Walking 1   Section 5 - Sitting 0   Section 6 - Standing 1   Section 7 - Sleeping 1   Section 8 - Sex life (if applicable) 0   Section 9 - Social life 0   Section 10 - Traveling 0   Count 10   Sum 7   Oswestry Score (%) 14   SECTION 1-PAIN INTENSITY The pain is very mild at the moment.   SECTION 2-PERSONAL CARE (WASHING,DRESSING,ETC.) I can look after myself normally without causing additional pain.   SECTION 3-LIFTING Pain prevents me from lifting heavy weights but I can manage light to medium weights if they are conveniently positioned.   SECTION 4-WALKING Pain prevents me from walking more than one mile.   SECTION 5-SITTING I can sit in any chair as long as I like.   SECTION 6-STANDING I can stand as long as I want but it gives me additional pain.   SECTION 7-SLEEPING My sleep is occasionally interrupted by pain.   SECTION 8-SEX LIFE (IF APPLICABLE) My sex life is normal and causes no additional pain.   SECTION 9-SOCIAL LIFE My social life is " normal and causes me no additional pain.   SECTION 10-TRAVELING I can travel anywhere without pain.   Oswestry Disability Index: Count 10   CARY: Total Score = SUM (total for answered questions) 7   Computed Oswestry Score 14 (%)   Some recent data might be hidden            Visual Analog Pain Scale  Back Pain Scale 0-10: 1  Right leg pain: 1  Left leg pain: 0    Promis 10 Assessment    PROMIS 10 11/24/2017   In general, would you say your health is: Very good   In general, would you say your quality of life is: Very good   In general, how would you rate your physical health? Very good   In general, how would you rate your mental health, including your mood and your ability to think? Good   In general, how would you rate your satisfaction with your social activities and relationships? Very good   In general, please rate how well you carry out your usual social activities and roles Very good   To what extent are you able to carry out your everyday physical activities such as walking, climbing stairs, carrying groceries, or moving a chair? Mostly   How often have you been bothered by emotional problems such as feeling anxious, depressed or irritable? Rarely   How would you rate your fatigue on average? Mild   How would you rate your pain on average?   0 = No Pain  to  10 = Worst Imaginable Pain 1   Global Physical Health Score : Raw Score -   Global Mental Health Score : Raw Score -   Total (Physical + Mental Health Score) -   In general, would you say your health is: 4   In general, would you say your quality of life is: 4   In general, how would you rate your physical health? 4   In general, how would you rate your mental health, including your mood and your ability to think? 3   In general, how would you rate your satisfaction with your social activities and relationships? 4   In general, please rate how well you carry out your usual social activities and roles. (This includes activities at home, at work and in your  community, and responsibilities as a parent, child, spouse, employee, friend, etc.) 4   To what extent are you able to carry out your everyday physical activities such as walking, climbing stairs, carrying groceries, or moving a chair? 4   In the past 7 days, how often have you been bothered by emotional problems such as feeling anxious, depressed, or irritable? 2   In the past 7 days, how would you rate your fatigue on average? 2   In the past 7 days, how would you rate your pain on average, where 0 means no pain, and 10 means worst imaginable pain? 1   Global Mental Health Score 15   Global Physical Health Score 16   PROMIS TOTAL - SUBSCORES 31   Some recent data might be hidden                Tere Mckinney LPN

## 2017-12-07 NOTE — MR AVS SNAPSHOT
After Visit Summary   12/7/2017    Tyler Barreto    MRN: [id number]           Patient Information     Date Of Birth          1965        Visit Information        Provider Department      12/7/2017 9:40 AM Jem Rocah MD [facility]        Today's Diagnoses     S/P spinal surgery    -  1       Follow-ups after your visit        Additional Services     PHYSICAL THERAPY REFERRAL (External-Prints)       Physical Therapy Referral    Lumbar spine postop.  Eventually, no permanent restrictions.                  Follow-up notes from your care team     Return in about 6 weeks (around 1/18/2018).      Your next 10 appointments already scheduled     Jan 16, 2018  8:40 AM CST   (Arrive by 8:10 AM)   RETURN SPINE with Jem Rocha MD   [facility] ([facility])    [address] [place] [id number] [phone]              Who to contact     Please call your clinic at [phone] to:    Ask questions about your health    Make or cancel appointments    Discuss your medicines    Learn about your test results    Speak to your doctor   If you have compliments or concerns about an experience at your clinic, or if you wish to file a complaint, please contact [facility] Patient Relations at [phone] or email us at [email or web address]         Additional Information About Your Visit        MyChart Information     Myagi gives you secure access to your electronic health record. If you see a primary care provider, you can also send messages to your care team and make appointments. If you have questions, please call your primary care clinic.  If you do not have a primary care provider, please call [phone] and they will assist you.      Myagi is an electronic gateway that provides easy, online access to your medical records. With Myagi, you can request a  "clinic appointment, read your test results, renew a prescription or communicate with your care team.     To access your existing account, please contact your [facility] or call [phone] for assistance.        Care EveryWhere ID     This is your Care EveryWhere ID. This could be used by other organizations to access your [place] medical records  [id number]        Your Vitals Were     Height BMI (Body Mass Index)                1.93 m (6' 4\") 32.87 kg/m2           Blood Pressure from Last 3 Encounters:   10/10/17 113/70   01/09/17 143/89   09/10/13 136/82    Weight from Last 3 Encounters:   12/07/17 122.5 kg (270 lb)   10/10/17 122.5 kg (270 lb)   08/24/17 129.4 kg (285 lb 4.8 oz)              We Performed the Following     PHYSICAL THERAPY REFERRAL (External-Prints)        Primary Care Provider Office Phone # Fax #    Taras Marina [phone]       [facility] [address]  [facility] [address]        Equal Access to Services     [facility]: Hadii aad ku hadasho Soomaali, waaxda luqadaha, qaybta kaalmada adeegyada, sadie mejia . So [facility] [phone].    ATENCIÓN: Si habla español, tiene a llanos disposición servicios gratuitos de asistencia lingüística. Brandy al [phone].    We comply with applicable federal civil rights laws and [place] laws. We do not discriminate on the basis of race, color, national origin, age, disability, sex, sexual orientation, or gender identity.            Thank you!     Thank you for choosing [facility]  for your care. Our goal is always to provide you with excellent care. Hearing back from our patients is one way we can continue to improve our services. Please take a few minutes to complete the written survey that you may receive in the mail after your visit with us. Thank you!             Your Updated Medication List - Protect others around you: Learn how to safely use, " store and throw away your medicines at www.disposemymeds.org.          This list is accurate as of: 12/7/17 11:59 PM.  Always use your most recent med list.                   Brand Name Dispense Instructions for use Diagnosis    acetaminophen 325 MG tablet    TYLENOL    100 tablet    Take 2 tablets (650 mg) by mouth every 4 hours as needed for other (mild pain)    S/P laminectomy       hydrOXYzine 25 MG capsule    VISTARIL    50 capsule    Take 1 capsule (25 mg) by mouth 4 times daily as needed for itching    S/P laminectomy       oxyCODONE IR 5 MG tablet    ROXICODONE    50 tablet    Take 1-2 tablets (5-10 mg) by mouth every 4 hours as needed for pain or other (Moderate to Severe)    S/P laminectomy       senna-docusate 8.6-50 MG per tablet    SENOKOT-S;PERICOLACE    30 tablet    Take 1-2 tablets by mouth 2 times daily Take while on oral narcotics to prevent or treat constipation.    S/P laminectomy       traZODone 50 MG tablet    DESYREL    90 tablet    Take 1-2 tablets ( mg) by mouth At Bedtime    Insomnia, unspecified

## 2017-12-07 NOTE — LETTER
12/7/2017       RE: Tyler Barreto  [address] [facility] [address]     Dear Colleague,    Thank you for referring your patient, Tyler Barreto, to the [facility] at [facility]. Please see a copy of my visit note below.    Reason for Visit:  Chief Complaint   Patient presents with     Surgical Followup     DOS 10/10/17 S/P Right Minimally Invasive Hemilaminectomy Lumbar 4-5, Lumbar 5-Sacral 1       S>  52 year old male 7 weeks s/p right MIS hemilaminectomy L4-5, L5-S1 here for his first post op visit. Symptoms prior to surgery ( R leg pain and numbness) have improved significantly. Not requiring pain medications. Returned to work 2 weeks post op. No concerns today. Does note occasional right foot numbness/tingling with prolonged walking/standing. Improving.     Oswestry (CARY) Questionnaire    OSWESTRY DISABILITY INDEX 11/24/2017   Section 1 - Pain intensity 1   Section 2 - Personal care (washing, dressing, etc.)  0   Section 3 - Lifting 3   Section 4 - Walking 1   Section 5 - Sitting 0   Section 6 - Standing 1   Section 7 - Sleeping 1   Section 8 - Sex life (if applicable) 0   Section 9 - Social life 0   Section 10 - Traveling 0   Count 10   Sum 7   Oswestry Score (%) 14   SECTION 1-PAIN INTENSITY The pain is very mild at the moment.   SECTION 2-PERSONAL CARE (WASHING,DRESSING,ETC.) I can look after myself normally without causing additional pain.   SECTION 3-LIFTING Pain prevents me from lifting heavy weights but I can manage light to medium weights if they are conveniently positioned.   SECTION 4-WALKING Pain prevents me from walking more than one mile.   SECTION 5-SITTING I can sit in any chair as long as I like.   SECTION 6-STANDING I can stand as long as I want but it gives me additional pain.   SECTION 7-SLEEPING My sleep is occasionally interrupted by pain.   SECTION 8-SEX LIFE (IF APPLICABLE) My sex life is normal and causes no additional  "pain.   SECTION 9-SOCIAL LIFE My social life is normal and causes me no additional pain.   SECTION 10-TRAVELING I can travel anywhere without pain.   Oswestry Disability Index: Count 10   CARY: Total Score = SUM (total for answered questions) 7   Computed Oswestry Score 14 (%)   Some recent data might be hidden      Numeric Rating Scale:  VAS Scores     VAS Survey 11/24/2017   What is your level of back pain during the last week: 2   What is your level of RIGHT leg pain during the last week: 2   What is your level of LEFT leg pain during the last week: 0   What is your level of neck pain during the last week: 4   What is your level of RIGHT arm pain during the last week: 0   What is your level of LEFT arm pain during the last week: 0     PROMIS-10 Scores  Global Mental Health Score: (P) 15  Global Physical Health Score: (P) 16  PROMIS TOTAL - SUBSCORES: (P) 31    O>   Alert, oriented x 3, cooperative.  Not in CP distress.  Ht 1.93 m (6' 4\")  Wt 122.5 kg (270 lb)  BMI 32.87 kg/m2  Surgical incision well-healed, no sign of infection.  Ambulates independently.   Grossly neurologically intact.    Imaging:   Lumbar spine Xray w/o acute osseous abnormality. Multilevel degenerative changes most pronounced at L4-S1, not significantly changed.     A>  52 year old male 7 weeks post op R MIS hemilaminectomy L4-5, L5-S1 on 10/10/2017. Doing excellent.     P>   Discussed and congratulated patient on significant improvement. Sent patient with referral to physical therapy. Increased lifting restriction to 20 lbs.   Currently scheduled for 3 month follow up on 1/16/2018. Will keep the appointment, but noted if no significant difficulty or changes that he may cancel appointment.       Scribed in part by Lucas Haase, MS3      Seen with MS Haase.  S> 52/m, 6 wks s/p R hemlaminec L4-5,L5-S1.  This is 1st postop visit.  Accompanied by wife.  Very happy with surgery.  preop symptoms resolved.  NO significant complaints.  Off " opioids.  CARY 7/50 or 14%.  Back pain 1/10, R leg 1, L leg 0.  PROMIS physical 16, mental 15, total 31.    O> Alert, oriented x 3.  Grossly neuro intact.  Normal gait.  Surg wound, lower lumbar well-healed, no sign of infection.  Imaging:  EOS lumbar Ap-lat show posthemilaminec change R L4-5 and R L5-S1.  No interval instability.  A> 6 wks postop, doing excellent.  P> Congratulated patient.  Reassured re my clinical and radiographic findings.  May advance lifting to 20 lbs; at 3 mos, may remove lifting restrictions, advance to full activities as tolerated.  - Postop PT order placed.  Has scheduled 3 mo postop visit on 1/16/18.  May call and cancel if doing great at that point.      Again, thank you for allowing me to participate in the care of your patient.      Sincerely,    Jem Rocha MD

## 2017-12-07 NOTE — PROGRESS NOTES
Reason for Visit:  Chief Complaint   Patient presents with     Surgical Followup     DOS 10/10/17 S/P Right Minimally Invasive Hemilaminectomy Lumbar 4-5, Lumbar 5-Sacral 1       S>  52 year old male 7 weeks s/p right MIS hemilaminectomy L4-5, L5-S1 here for his first post op visit. Symptoms prior to surgery ( R leg pain and numbness) have improved significantly. Not requiring pain medications. Returned to work 2 weeks post op. No concerns today. Does note occasional right foot numbness/tingling with prolonged walking/standing. Improving.     Oswestry (CARY) Questionnaire    OSWESTRY DISABILITY INDEX 11/24/2017   Section 1 - Pain intensity 1   Section 2 - Personal care (washing, dressing, etc.)  0   Section 3 - Lifting 3   Section 4 - Walking 1   Section 5 - Sitting 0   Section 6 - Standing 1   Section 7 - Sleeping 1   Section 8 - Sex life (if applicable) 0   Section 9 - Social life 0   Section 10 - Traveling 0   Count 10   Sum 7   Oswestry Score (%) 14   SECTION 1-PAIN INTENSITY The pain is very mild at the moment.   SECTION 2-PERSONAL CARE (WASHING,DRESSING,ETC.) I can look after myself normally without causing additional pain.   SECTION 3-LIFTING Pain prevents me from lifting heavy weights but I can manage light to medium weights if they are conveniently positioned.   SECTION 4-WALKING Pain prevents me from walking more than one mile.   SECTION 5-SITTING I can sit in any chair as long as I like.   SECTION 6-STANDING I can stand as long as I want but it gives me additional pain.   SECTION 7-SLEEPING My sleep is occasionally interrupted by pain.   SECTION 8-SEX LIFE (IF APPLICABLE) My sex life is normal and causes no additional pain.   SECTION 9-SOCIAL LIFE My social life is normal and causes me no additional pain.   SECTION 10-TRAVELING I can travel anywhere without pain.   Oswestry Disability Index: Count 10   CARY: Total Score = SUM (total for answered questions) 7   Computed Oswestry Score 14 (%)   Some recent  "data might be hidden      Numeric Rating Scale:  VAS Scores     VAS Survey 11/24/2017   What is your level of back pain during the last week: 2   What is your level of RIGHT leg pain during the last week: 2   What is your level of LEFT leg pain during the last week: 0   What is your level of neck pain during the last week: 4   What is your level of RIGHT arm pain during the last week: 0   What is your level of LEFT arm pain during the last week: 0     PROMIS-10 Scores  Global Mental Health Score: (P) 15  Global Physical Health Score: (P) 16  PROMIS TOTAL - SUBSCORES: (P) 31    O>   Alert, oriented x 3, cooperative.  Not in CP distress.  Ht 1.93 m (6' 4\")  Wt 122.5 kg (270 lb)  BMI 32.87 kg/m2  Surgical incision well-healed, no sign of infection.  Ambulates independently.   Grossly neurologically intact.    Imaging:   Lumbar spine Xray w/o acute osseous abnormality. Multilevel degenerative changes most pronounced at L4-S1, not significantly changed.     A>  52 year old male 7 weeks post op R MIS hemilaminectomy L4-5, L5-S1 on 10/10/2017. Doing excellent.     P>   Discussed and congratulated patient on significant improvement. Sent patient with referral to physical therapy. Increased lifting restriction to 20 lbs.   Currently scheduled for 3 month follow up on 1/16/2018. Will keep the appointment, but noted if no significant difficulty or changes that he may cancel appointment.       Scribed in part by Lucas Haase, MS3    "

## 2017-12-09 NOTE — PROGRESS NOTES
Seen with MS Haase.  S> 52/m, 6 wks s/p R hemlaminec L4-5,L5-S1.  This is 1st postop visit.  Accompanied by wife.  Very happy with surgery.  preop symptoms resolved.  NO significant complaints.  Off opioids.  CARY 7/50 or 14%.  Back pain 1/10, R leg 1, L leg 0.  PROMIS physical 16, mental 15, total 31.    O> Alert, oriented x 3.  Grossly neuro intact.  Normal gait.  Surg wound, lower lumbar well-healed, no sign of infection.  Imaging:  EOS lumbar Ap-lat show posthemilaminec change R L4-5 and R L5-S1.  No interval instability.  A> 6 wks postop, doing excellent.  P> Congratulated patient.  Reassured re my clinical and radiographic findings.  May advance lifting to 20 lbs; at 3 mos, may remove lifting restrictions, advance to full activities as tolerated.  - Postop PT order placed.  Has scheduled 3 mo postop visit on 1/16/18.  May call and cancel if doing great at that point.

## 2019-11-03 ENCOUNTER — HEALTH MAINTENANCE LETTER (OUTPATIENT)
Age: 54
End: 2019-11-03

## 2020-05-18 ENCOUNTER — TRANSFERRED RECORDS (OUTPATIENT)
Dept: HEALTH INFORMATION MANAGEMENT | Facility: CLINIC | Age: 55
End: 2020-05-18

## 2020-11-16 ENCOUNTER — HEALTH MAINTENANCE LETTER (OUTPATIENT)
Age: 55
End: 2020-11-16

## 2021-02-01 ENCOUNTER — TRANSFERRED RECORDS (OUTPATIENT)
Dept: HEALTH INFORMATION MANAGEMENT | Facility: CLINIC | Age: 56
End: 2021-02-01

## 2021-06-29 ENCOUNTER — MEDICAL CORRESPONDENCE (OUTPATIENT)
Dept: HEALTH INFORMATION MANAGEMENT | Facility: CLINIC | Age: 56
End: 2021-06-29

## 2021-07-06 ENCOUNTER — TRANSCRIBE ORDERS (OUTPATIENT)
Dept: OTHER | Age: 56
End: 2021-07-06

## 2021-07-06 DIAGNOSIS — H92.01 PAIN, EAR, RIGHT: Primary | ICD-10-CM

## 2021-08-03 ENCOUNTER — OFFICE VISIT (OUTPATIENT)
Dept: AUDIOLOGY | Facility: CLINIC | Age: 56
End: 2021-08-03
Attending: FAMILY MEDICINE
Payer: COMMERCIAL

## 2021-08-03 ENCOUNTER — OFFICE VISIT (OUTPATIENT)
Dept: OTOLARYNGOLOGY | Facility: CLINIC | Age: 56
End: 2021-08-03
Attending: FAMILY MEDICINE
Payer: COMMERCIAL

## 2021-08-03 ENCOUNTER — PRE VISIT (OUTPATIENT)
Dept: OTOLARYNGOLOGY | Facility: CLINIC | Age: 56
End: 2021-08-03

## 2021-08-03 VITALS
HEART RATE: 78 BPM | HEIGHT: 76 IN | OXYGEN SATURATION: 98 % | BODY MASS INDEX: 29.26 KG/M2 | WEIGHT: 240.3 LBS | TEMPERATURE: 97.3 F

## 2021-08-03 DIAGNOSIS — M54.2 NECK PAIN: ICD-10-CM

## 2021-08-03 DIAGNOSIS — H90.41 SENSORINEURAL HEARING LOSS (SNHL) OF RIGHT EAR WITH UNRESTRICTED HEARING OF LEFT EAR: Primary | ICD-10-CM

## 2021-08-03 DIAGNOSIS — G44.209 TENSION HEADACHE: ICD-10-CM

## 2021-08-03 DIAGNOSIS — H93.8X1 EAR FULLNESS, RIGHT: Primary | ICD-10-CM

## 2021-08-03 DIAGNOSIS — R51.9 FACE PAIN: ICD-10-CM

## 2021-08-03 PROCEDURE — 92565 STENGER TEST PURE TONE: CPT | Performed by: AUDIOLOGIST

## 2021-08-03 PROCEDURE — 31575 DIAGNOSTIC LARYNGOSCOPY: CPT | Performed by: REGISTERED NURSE

## 2021-08-03 PROCEDURE — 99204 OFFICE O/P NEW MOD 45 MIN: CPT | Mod: 25 | Performed by: REGISTERED NURSE

## 2021-08-03 PROCEDURE — 92557 COMPREHENSIVE HEARING TEST: CPT | Performed by: AUDIOLOGIST

## 2021-08-03 PROCEDURE — 92550 TYMPANOMETRY & REFLEX THRESH: CPT | Performed by: AUDIOLOGIST

## 2021-08-03 RX ORDER — GABAPENTIN 300 MG/1
CAPSULE ORAL
COMMUNITY
Start: 2021-05-07 | End: 2022-07-26

## 2021-08-03 RX ORDER — FLUTICASONE PROPIONATE 50 MCG
SPRAY, SUSPENSION (ML) NASAL
COMMUNITY
Start: 2021-06-08 | End: 2022-12-27

## 2021-08-03 ASSESSMENT — PAIN SCALES - GENERAL: PAINLEVEL: NO PAIN (0)

## 2021-08-03 ASSESSMENT — MIFFLIN-ST. JEOR: SCORE: 2031.5

## 2021-08-03 NOTE — PROGRESS NOTES
AUDIOLOGY REPORT    SUMMARY: Audiology visit completed. See audiogram for results.      RECOMMENDATIONS: Follow-up with ENT.    Joao Newman CCC-A  Licensed Audiologist   [place] #[id number]

## 2021-08-03 NOTE — PATIENT INSTRUCTIONS
- You were seen in the ENT Clinic today by Danica Dhillon NP.   - For TMJ pain--General Recommendations:  48-72 hours of IBUPROFEN/ADVIL 600  mg ( 3 pills) every 8 hours x 7 days.  Take with food to avoid stomach upset.   Gentle massage of the muscle above your ear is recommended also.   Warm packs are fine also.   SOFT DIET /NO CHEWING is recommended  - Referral to Neurology has been placed.  - Please send a Rooster[employer] message or call the ENT clinic at [phone] with any questions or concerns.    
Fall

## 2021-08-03 NOTE — NURSING NOTE
"Chief Complaint   Patient presents with     Consult     Ongoing right ear pain/pressure       Pulse 78, temperature 97.3  F (36.3  C), temperature source Temporal, height 1.93 m (6' 4\"), weight 109 kg (240 lb 4.8 oz), SpO2 98 %.    Cristel Sexton, EMT    "

## 2021-08-03 NOTE — LETTER
8/3/2021        RE: Tyler Barreto  [address]  [facility] [place] [facility] [address]     Dear Colleague,    Thank you for referring your patient, Tyler Barreto, to the [facility] EAR NOSE [facility]ROAT [facility]IN[facility] [place] at [facility]. Please see a copy of my visit note below.      Otolaryngology Clinic  August 3, 2021    Chief Complaint:   Right sided facial/ear pain       History of Present Illness:   Tyler Barreto is a 55 year old male who presents today for evaluation of right sided ear pain and pressure. Symptoms began over a year ago after a persistent right ear infection that required 3 rounds of antibiotic treatment. Infection has resolved, per patient, but right aural fullness and tinnitus remain. Both fullness and tinnitus are intermittent and change in severity. Valsalva does not relieve the pressure in the right ear. Also reports intermittent vertigo/dizziness when fullness/tinnitus is severe. Denies any room spinning vertigo.  Ear pain can radiate across the entire right side of face and up the temple. Patient has been seen by PT for myofacial release that can provide relief but is not long lasting. No relief with OTC antihistamines or flonase.     Patient denies any otorrhea, ear surgeries, history to noise exposure, vision changes, history of migraines or left sided symptoms.    Patient had a MRI in May 2020 that was negative for any ear pathology. 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     Allergy testing was negative for any significant allergies. He has not seen Neurology or had any formal headache/facial pain evaluation.     Past Medical History:  No past medical history on file.    Past Surgical History:  Past Surgical History:   Procedure Laterality Date     DISCECTOMY LUMBAR MINIMALLY INVASIVE TWO LEVELS Right 10/10/2017    Procedure: DISCECTOMY LUMBAR MINIMALLY INVASIVE TWO LEVELS;  " Right Minimally Invasive Hemilaminectomy Lumbar 4-5, Lumbar 5-Sacral 1;  Surgeon: Jem Rocha MD;  Location:  OR       Medications:  Current Outpatient Medications   Medication Sig Dispense Refill     fluticasone (FLONASE) 50 MCG/ACT nasal spray        gabapentin (NEURONTIN) 300 MG capsule        traZODone (DESYREL) 50 MG tablet Take 1-2 tablets ( mg) by mouth At Bedtime 90 tablet 1     Allergies:  Allergies   Allergen Reactions     No Known Allergies       Social History:  Social History     Tobacco Use     Smoking status: Passive Smoke Exposure - Never Smoker     Smokeless tobacco: Current User     Types: Chew     Tobacco comment: Declined quit help   Substance Use Topics     Alcohol use: Yes     Drug use: No     ROS: 10 point ROS neg other than the symptoms noted above in the HPI.    Physical Exam:    Pulse 78   Temp 97.3  F (36.3  C) (Temporal)   Ht 1.93 m (6' 4\")   Wt 109 kg (240 lb 4.8 oz)   SpO2 98%   BMI 29.25 kg/m       Constitutional:  The patient was unaccompanied, well-groomed, and in no acute distress.     Neurologic: Alert and oriented x 3.  CN's III-XII within normal limits.  Voice normal.    Psychiatric: The patient's affect was calm, cooperative, and appropriate.     Communication:  Normal; communicates verbally, normal voice quality.    Respiratory: Breathing comfortably without stridor or exertion of accessory muscles.    Head/Face:  Normocephalic and atraumatic.    Eyes: Pupils were equal and reactive.  Extraocular movement intact.   Ears: Pinnae and tragus non-tender.  EAC's and TM's were clear.     Oral Cavity: Normal tongue, floor of mouth, buccal mucosa, and palate.  No lesions or masses on inspection or palpation.     Oropharynx: Normal mucosa, palate symmetric with normal elevation. No abnormal lymph tissue in the oropharynx.  Slight tenderness with palpation of temporomandibular joints, particularly on right side.   Lymphatic: There is no palpable " lymphadenopathy in the neck.       Flexible fiberoptic laryngoscopy: Scope exam was indicated due to right ear symptoms requiring evaluation of right eustachian tube. Verbal consent was obtained. The nasal cavity was prepped with an aerosolized solution of topical anesthetic and vasoconstrictive agent. The scope was passed through the anterior nasal cavity and advanced. Inspection of the nasopharynx revealed no gross abnormality. Eustachian tubes were clear bilaterally. The base of tongue and vallecula are normal. The epiglottis, AE folds, false cords, true cords, arytenoids are normal. Inspection of the larynx revealed bilaterally mobile vocal cords. Pyriform sinuses are symmetric. The airway is patent. Procedure tolerated well with no immediate complications noted.    Otologic microscope exam:    Right ear was examined under the microscope.  Normal appearing TM, nicely aerated middle ear space.    Left ear was also examined under the microscope.  Normal appearing TM, nicely aerated middle ear space.      Audiogram: 8/3/2021- data independently reviewed  Right ear: Normal through 2 kHz to mild SNHL   Left ear: Normal to borderline normal hearing.   WR right: 100% left: 96% @ 55 dB  Acoustic Reflexes: present in all conditions at 1K  Tympanograms: type A right, type A left          Assessment and Plan:    Patient with chronic ear fullness and facial pain. There is no evidence of a middle ear effusion or blocked eustachian tube that could be contributing to symptoms. Discussed with patient that symptoms may likely be muscular, causing tension type headaches. Also discussed possibility of TMJ or jaw tension that could be contributing to symptoms. Reviewed management of TMJ. Will place referral to neurology for further evaluation of tension headaches and facial pain.     Patient will follow up as needed for any worsening ear symptoms, new ear drainage or hearing loss.    Danica Dhillon DNP, APRN,  CNP  Otolaryngology  Head & Neck Surgery  [phone]    60 minutes spent on the date of the encounter doing chart review, history and exam, documentation and further activities per the note          Again, thank you for allowing me to participate in the care of your patient.      Sincerely,    Candelaria Dhillon NP

## 2021-08-05 NOTE — PROGRESS NOTES
Otolaryngology Clinic  August 3, 2021    Chief Complaint:   Right sided facial/ear pain       History of Present Illness:   Tyler Barreto is a 55 year old male who presents today for evaluation of right sided ear pain and pressure. Symptoms began over a year ago after a persistent right ear infection that required 3 rounds of antibiotic treatment. Infection has resolved, per patient, but right aural fullness and tinnitus remain. Both fullness and tinnitus are intermittent and change in severity. Valsalva does not relieve the pressure in the right ear. Also reports intermittent vertigo/dizziness when fullness/tinnitus is severe. Denies any room spinning vertigo.  Ear pain can radiate across the entire right side of face and up the temple. Patient has been seen by PT for myofacial release that can provide relief but is not long lasting. No relief with OTC antihistamines or flonase.     Patient denies any otorrhea, ear surgeries, history to noise exposure, vision changes, history of migraines or left sided symptoms.    Patient had a MRI in May 2020 that was negative for any ear pathology. 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     Allergy testing was negative for any significant allergies. He has not seen Neurology or had any formal headache/facial pain evaluation.     Past Medical History:  No past medical history on file.    Past Surgical History:  Past Surgical History:   Procedure Laterality Date     DISCECTOMY LUMBAR MINIMALLY INVASIVE TWO LEVELS Right 10/10/2017    Procedure: DISCECTOMY LUMBAR MINIMALLY INVASIVE TWO LEVELS;  Right Minimally Invasive Hemilaminectomy Lumbar 4-5, Lumbar 5-Sacral 1;  Surgeon: Jem Rocha MD;  Location:  OR       Medications:  Current Outpatient Medications   Medication Sig Dispense Refill     fluticasone (FLONASE) 50 MCG/ACT nasal spray        gabapentin (NEURONTIN) 300 MG capsule         "traZODone (DESYREL) 50 MG tablet Take 1-2 tablets ( mg) by mouth At Bedtime 90 tablet 1     Allergies:  Allergies   Allergen Reactions     No Known Allergies       Social History:  Social History     Tobacco Use     Smoking status: Passive Smoke Exposure - Never Smoker     Smokeless tobacco: Current User     Types: Chew     Tobacco comment: Declined quit help   Substance Use Topics     Alcohol use: Yes     Drug use: No     ROS: 10 point ROS neg other than the symptoms noted above in the HPI.    Physical Exam:    Pulse 78   Temp 97.3  F (36.3  C) (Temporal)   Ht 1.93 m (6' 4\")   Wt 109 kg (240 lb 4.8 oz)   SpO2 98%   BMI 29.25 kg/m       Constitutional:  The patient was unaccompanied, well-groomed, and in no acute distress.     Neurologic: Alert and oriented x 3.  CN's III-XII within normal limits.  Voice normal.    Psychiatric: The patient's affect was calm, cooperative, and appropriate.     Communication:  Normal; communicates verbally, normal voice quality.    Respiratory: Breathing comfortably without stridor or exertion of accessory muscles.    Head/Face:  Normocephalic and atraumatic.    Eyes: Pupils were equal and reactive.  Extraocular movement intact.   Ears: Pinnae and tragus non-tender.  EAC's and TM's were clear.     Oral Cavity: Normal tongue, floor of mouth, buccal mucosa, and palate.  No lesions or masses on inspection or palpation.     Oropharynx: Normal mucosa, palate symmetric with normal elevation. No abnormal lymph tissue in the oropharynx.  Slight tenderness with palpation of temporomandibular joints, particularly on right side.   Lymphatic: There is no palpable lymphadenopathy in the neck.       Flexible fiberoptic laryngoscopy: Scope exam was indicated due to right ear symptoms requiring evaluation of right eustachian tube. Verbal consent was obtained. The nasal cavity was prepped with an aerosolized solution of topical anesthetic and vasoconstrictive agent. The scope was passed " through the anterior nasal cavity and advanced. Inspection of the nasopharynx revealed no gross abnormality. Eustachian tubes were clear bilaterally. The base of tongue and vallecula are normal. The epiglottis, AE folds, false cords, true cords, arytenoids are normal. Inspection of the larynx revealed bilaterally mobile vocal cords. Pyriform sinuses are symmetric. The airway is patent. Procedure tolerated well with no immediate complications noted.    Otologic microscope exam:    Right ear was examined under the microscope.  Normal appearing TM, nicely aerated middle ear space.    Left ear was also examined under the microscope.  Normal appearing TM, nicely aerated middle ear space.      Audiogram: 8/3/2021- data independently reviewed  Right ear: Normal through 2 kHz to mild SNHL   Left ear: Normal to borderline normal hearing.   WR right: 100% left: 96% @ 55 dB  Acoustic Reflexes: present in all conditions at 1K  Tympanograms: type A right, type A left          Assessment and Plan:    Patient with chronic ear fullness and facial pain. There is no evidence of a middle ear effusion or blocked eustachian tube that could be contributing to symptoms. Discussed with patient that symptoms may likely be muscular, causing tension type headaches. Also discussed possibility of TMJ or jaw tension that could be contributing to symptoms. Reviewed management of TMJ. Will place referral to neurology for further evaluation of tension headaches and facial pain.     Patient will follow up as needed for any worsening ear symptoms, new ear drainage or hearing loss.    Danica Dhillon DNP, APRN, CNP  Otolaryngology  Head & Neck Surgery  [phone]    60 minutes spent on the date of the encounter doing chart review, history and exam, documentation and further activities per the note

## 2021-09-18 ENCOUNTER — HEALTH MAINTENANCE LETTER (OUTPATIENT)
Age: 56
End: 2021-09-18

## 2021-10-25 NOTE — TELEPHONE ENCOUNTER
FUTURE VISIT INFORMATION      FUTURE VISIT INFORMATION:    Date: 11/12/2021    Time: 315pm    Location: [facility]  REFERRAL INFORMATION:    Referring provider:  Dr. Dhillon    Referring providers clinic:  [facility] ENT     Reason for visit/diagnosis  Headaches     RECORDS REQUESTED FROM:       Clinic name Comments Records Status Imaging Status   Internal Dr. Dhillon-8/3/2021 [facility] N/A          [place]  MR Brain-5/21/2020    CT Head-3/10/2020 Care Everywhere PACS

## 2021-11-12 ENCOUNTER — PRE VISIT (OUTPATIENT)
Dept: NEUROLOGY | Facility: CLINIC | Age: 56
End: 2021-11-12

## 2022-01-08 ENCOUNTER — HEALTH MAINTENANCE LETTER (OUTPATIENT)
Age: 57
End: 2022-01-08

## 2022-01-27 NOTE — PROGRESS NOTES
"[facility]/[place]  Section of General Neurology  New Patient Visit      Tyler Barreto MRN# [id number]   Age: 56 year old YOB: 1965     Requesting physician: Taras Borden     Reason for Consultation: head pain        History of Presenting Symptoms:   Tyler Barreto is a 56 year old male who presents today for evaluation of tension headaches, facial pain.    He saw Danica Dhillon CNP in August for ear pain, tinnitus, she noted:  \"Patient with chronic ear fullness and facial pain. There is no evidence of a middle ear effusion or blocked eustachian tube that could be contributing to symptoms. Discussed with patient that symptoms may likely be muscular, causing tension type headaches. Also discussed possibility of TMJ or jaw tension that could be contributing to symptoms. Reviewed management of TMJ. Will place referral to neurology for further evaluation of tension headaches and facial pain.\"    Previous medications include gabapentin, trazodone for sleep    He lives in [place]  He is here with his girlfriend Marychuy    Headache specific questions:  Location (unilateral/whole head/etc):   Has had this issue x 2 years.  It started in the end of 2019, required antibiotics, had ringing in his ears since that time.  Morphed into sinus pressure.  Chased this through ENT, was told they couldn't help him.    He still notes dull pain in his head on the right into his ear, nasal passage gets congested or restricted easily.   Daily allergy medication seems to help  Tries intranasal things too.    Comes and goes.    Weather change possible changes it.    Provoking factors  Visual changes--none  Nausea/vomiting: none  Sensitivity to light/sound: none  Previously got some vertigo and imbalance, that improved  Sleep (including EDILMA screen)--ringing can affect his sleep.  Does do white noise.    Occipital nerve pain?--can come up through the neck, has tried chiropractic medication, has " tried facial massage.   Accupucture --hasn't tried  Family history of headaches--none  Has been able to work through it, emotionally depressing.    Overall pain is in right temple primarily but can spread from there and remains a dull fullness and pressure that he can't fully wrap his head around.      He tried gabapentin 300 mg BID, didn't help.            Past Medical History:     Patient Active Problem List   Diagnosis     Enthesopathy     Insomnia     Osteoarthritis of spine with radiculopathy, lumbar region     Right L5-S1 foraminal stenosis     Right disc herniation L4-5     Radiculopathy R L5     No past medical history on file.     Past Surgical History:     Past Surgical History:   Procedure Laterality Date     DISCECTOMY LUMBAR MINIMALLY INVASIVE TWO LEVELS Right 10/10/2017    Procedure: DISCECTOMY LUMBAR MINIMALLY INVASIVE TWO LEVELS;  Right Minimally Invasive Hemilaminectomy Lumbar 4-5, Lumbar 5-Sacral 1;  Surgeon: Jem Rocha MD;  Location:  OR        Social History:     Social History     Tobacco Use     Smoking status: Passive Smoke Exposure - Never Smoker     Smokeless tobacco: Current User     Types: Chew     Tobacco comment: Declined quit help   Substance Use Topics     Alcohol use: Yes     Drug use: No        Family History:     Family History   Problem Relation Age of Onset     Diabetes Mother      Hypertension Mother      Eye Disorder Mother         CATARACTS     Breast Cancer Mother      Eye Disorder Father         CATARACTS     Other Cancer Father         Medications:     Current Outpatient Medications   Medication Sig     fluticasone (FLONASE) 50 MCG/ACT nasal spray      gabapentin (NEURONTIN) 300 MG capsule      traZODone (DESYREL) 50 MG tablet Take 1-2 tablets ( mg) by mouth At Bedtime     No current facility-administered medications for this visit.        Allergies:     Allergies   Allergen Reactions     No Known Allergies         Review of Systems:   As noted  "above     Physical Exam:   Vitals: BP (!) 144/83   Pulse 73   Ht 1.93 m (6' 4\")   Wt 115.7 kg (255 lb)   SpO2 98%   BMI 31.04 kg/m    CV: peripheral pulse appreciated  Lungs: breathing comfortably  Extremities: no edema    Neuro:   General Appearance: No apparent distress, well-nourished, well-groomed, pleasant     Mental Status: Alert and oriented to person, place, and time. Speech fluent and comprehension intact. No dysarthria.     Fundoscopic Exam: Optic discs sharp without evidence of papilledema bilaterally    Cranial Nerves:   II: Visual fields: normal  III: Pupils: 3 mm, equal, round, reactive to light   III,IV,VI: Extraocular Movements: intact   V: Facial sensation: intact to light touch  VII: Facial strength: intact without asymmetry  VIII: Hearing: intact grossly  IX: Palate: intact   XI: Shoulder shrug: intact  XII: Tongue movement: normal     Motor Exam:   5/5 Diffusely    Sensory: intact to light touch    Coordination: no dysmetria with finger-to-nose bilaterally    Reflexes: biceps, triceps, brachioradialis, patellar, and ankle jerks 2+ --though did not test R upper extremity reflexes given some elbow pain.     Gait: normal casual gait, normal stride length, romberg negative         Data: Pertinent prior to visit   [email or web address] MRI Brain    EXAM: MR BRAIN WITHOUT AND WITH IV CONTRAST     COMPARISON: CT head 3/10/20.     FINDINGS: This is a MRI of the internal auditory canals. Negative for   retrocochlear lesion or vestibular schwannoma. The bilateral seventh and eighth   cranial nerves have normal signal intensity, morphology, and enhancement.   Bilateral cerebellopontine angle cisterns and internal auditory canals are   normal without masses or fluid collections.     Small right mastoid effusion. Left mastoid air cells are clear.     Overview imaging through the brain shows chronic lacunar infarct in the left   periventricular white matter (series 4 image 28). There is mild amount of "   chronic microangiopathy within the cerebral white matter.     There is 4 mm nonenhancing cystic lesion along the right superior aspect of the   pituitary gland (series 6 image 104). This is usually Rathke cleft cyst, though   could be incidental pituitary microadenoma.    I personally reviewed the above imaging and agree with the findings in the report--images are in PACS.               Assessment and Plan:   Assessment:  Tyler Barreto is a very pleasant 56 year old man who presents with chief complaint of headache.  This started after a rather profound viral illness that likely led to some otitis media requiring several rounds of antibiotics for possible sinus infection as well per patient report.  Since that time he has had right sided head pressure, pain and tinnitus. Work up via ENT and audiology has been unrevealing.  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  He may continue to slowly improve on his own but the mainstay of treatment would be supportive as below to try to give him good quality of life given essentially negative MRI w/w/o and ENT/audiology work up.  His symptoms are not in a distribution where this would be considered trigeminal neuralgia.     Plan:  --Start nortriptyline 25 mg at bedtime if tolerating well can go up to 50 mg at bedtime after 2-3 weeks.  He did not feel the gabapentin is helpful, will stop.  Theoretical interaction with trazodone, risk of serotonin syndrome very low at low doses, but would consider stopping trazodone concurrently.   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   --Follow up with me in 3 months can call or mychart with questions in the mean time.            Pop Michael MD   of Neurology   [facility]      The total time of this  encounter today amounted to 46 minutes. This time included time spent with the patient, prep work, ordering tests, and performing post visit documentation.

## 2022-01-28 ENCOUNTER — OFFICE VISIT (OUTPATIENT)
Dept: NEUROLOGY | Facility: CLINIC | Age: 57
End: 2022-01-28
Attending: REGISTERED NURSE
Payer: COMMERCIAL

## 2022-01-28 VITALS
HEART RATE: 73 BPM | OXYGEN SATURATION: 98 % | HEIGHT: 76 IN | DIASTOLIC BLOOD PRESSURE: 83 MMHG | WEIGHT: 255 LBS | BODY MASS INDEX: 31.05 KG/M2 | SYSTOLIC BLOOD PRESSURE: 144 MMHG

## 2022-01-28 DIAGNOSIS — G47.09 OTHER INSOMNIA: Primary | ICD-10-CM

## 2022-01-28 DIAGNOSIS — M54.2 NECK PAIN: ICD-10-CM

## 2022-01-28 DIAGNOSIS — R51.9 FACE PAIN: ICD-10-CM

## 2022-01-28 DIAGNOSIS — G44.209 TENSION HEADACHE: ICD-10-CM

## 2022-01-28 PROCEDURE — 99204 OFFICE O/P NEW MOD 45 MIN: CPT | Performed by: STUDENT IN AN ORGANIZED HEALTH CARE EDUCATION/TRAINING PROGRAM

## 2022-01-28 RX ORDER — NORTRIPTYLINE HCL 25 MG
25 CAPSULE ORAL AT BEDTIME
Qty: 90 CAPSULE | Refills: 1 | Status: SHIPPED | OUTPATIENT
Start: 2022-01-28 | End: 2022-12-27

## 2022-01-28 ASSESSMENT — MIFFLIN-ST. JEOR: SCORE: 2093.17

## 2022-01-28 NOTE — PATIENT INSTRUCTIONS
I would try this medicine and consider stopping trazodone for now.    Try it for 2-3 weeks at 25 mg at night, can go up to 50 mg if tolerating  I do think this is a post viral syndrome and it makes sense --we can try various medication approaches to help you  Accupuncture does help some of my patient with similar issues. Up to you if you wish to try this alternative approach too.

## 2022-01-28 NOTE — LETTER
"    1/28/2022         RE: Tyler Barreto  [address]  [facility] [address]        Dear Colleague,    Thank you for referring your patient, Tyler Barreto, to the [facility]. Please see a copy of my visit note below.    [facility]/[place]  Section of General Neurology  New Patient Visit      Tyler Barreto MRN# [id number]   Age: 56 year old YOB: 1965     Requesting physician: Taras Borden     Reason for Consultation: head pain        History of Presenting Symptoms:   Tyler Barreto is a 56 year old male who presents today for evaluation of tension headaches, facial pain.    He saw Danica Dhillon CNP in August for ear pain, tinnitus, she noted:  \"Patient with chronic ear fullness and facial pain. There is no evidence of a middle ear effusion or blocked eustachian tube that could be contributing to symptoms. Discussed with patient that symptoms may likely be muscular, causing tension type headaches. Also discussed possibility of TMJ or jaw tension that could be contributing to symptoms. Reviewed management of TMJ. Will place referral to neurology for further evaluation of tension headaches and facial pain.\"    Previous medications include gabapentin, trazodone for sleep    He lives in [place]  He is here with his girlfriend Marychuy    Headache specific questions:  Location (unilateral/whole head/etc):   Has had this issue x 2 years.  It started in the end of 2019, required antibiotics, had ringing in his ears since that time.  Morphed into sinus pressure.  Chased this through ENT, was told they couldn't help him.    He still notes dull pain in his head on the right into his ear, nasal passage gets congested or restricted easily.   Daily allergy medication seems to help  Tries intranasal things too.    Comes and goes.    Weather change possible changes it.    Provoking factors  Visual changes--none  Nausea/vomiting: " none  Sensitivity to light/sound: none  Previously got some vertigo and imbalance, that improved  Sleep (including EDILMA screen)--ringing can affect his sleep.  Does do white noise.    Occipital nerve pain?--can come up through the neck, has tried chiropractic medication, has tried facial massage.   Accupucture --hasn't tried  Family history of headaches--none  Has been able to work through it, emotionally depressing.    Overall pain is in right temple primarily but can spread from there and remains a dull fullness and pressure that he can't fully wrap his head around.      He tried gabapentin 300 mg BID, didn't help.            Past Medical History:     Patient Active Problem List   Diagnosis     Enthesopathy     Insomnia     Osteoarthritis of spine with radiculopathy, lumbar region     Right L5-S1 foraminal stenosis     Right disc herniation L4-5     Radiculopathy R L5     No past medical history on file.     Past Surgical History:     Past Surgical History:   Procedure Laterality Date     DISCECTOMY LUMBAR MINIMALLY INVASIVE TWO LEVELS Right 10/10/2017    Procedure: DISCECTOMY LUMBAR MINIMALLY INVASIVE TWO LEVELS;  Right Minimally Invasive Hemilaminectomy Lumbar 4-5, Lumbar 5-Sacral 1;  Surgeon: Jem Rocha MD;  Location:  OR        Social History:     Social History     Tobacco Use     Smoking status: Passive Smoke Exposure - Never Smoker     Smokeless tobacco: Current User     Types: Chew     Tobacco comment: Declined quit help   Substance Use Topics     Alcohol use: Yes     Drug use: No        Family History:     Family History   Problem Relation Age of Onset     Diabetes Mother      Hypertension Mother      Eye Disorder Mother         CATARACTS     Breast Cancer Mother      Eye Disorder Father         CATARACTS     Other Cancer Father         Medications:     Current Outpatient Medications   Medication Sig     fluticasone (FLONASE) 50 MCG/ACT nasal spray      gabapentin (NEURONTIN) 300 MG  "capsule      traZODone (DESYREL) 50 MG tablet Take 1-2 tablets ( mg) by mouth At Bedtime     No current facility-administered medications for this visit.        Allergies:     Allergies   Allergen Reactions     No Known Allergies         Review of Systems:   As noted above     Physical Exam:   Vitals: BP (!) 144/83   Pulse 73   Ht 1.93 m (6' 4\")   Wt 115.7 kg (255 lb)   SpO2 98%   BMI 31.04 kg/m    CV: peripheral pulse appreciated  Lungs: breathing comfortably  Extremities: no edema    Neuro:   General Appearance: No apparent distress, well-nourished, well-groomed, pleasant     Mental Status: Alert and oriented to person, place, and time. Speech fluent and comprehension intact. No dysarthria.     Fundoscopic Exam: Optic discs sharp without evidence of papilledema bilaterally    Cranial Nerves:   II: Visual fields: normal  III: Pupils: 3 mm, equal, round, reactive to light   III,IV,VI: Extraocular Movements: intact   V: Facial sensation: intact to light touch  VII: Facial strength: intact without asymmetry  VIII: Hearing: intact grossly  IX: Palate: intact   XI: Shoulder shrug: intact  XII: Tongue movement: normal     Motor Exam:   5/5 Diffusely    Sensory: intact to light touch    Coordination: no dysmetria with finger-to-nose bilaterally    Reflexes: biceps, triceps, brachioradialis, patellar, and ankle jerks 2+ --though did not test R upper extremity reflexes given some elbow pain.     Gait: normal casual gait, normal stride length, romberg negative         Data: Pertinent prior to visit   Imagin2020 MRI Brain    EXAM: MR BRAIN WITHOUT AND WITH IV CONTRAST     COMPARISON: CT head 3/10/20.     FINDINGS: This is a MRI of the internal auditory canals. Negative for   retrocochlear lesion or vestibular schwannoma. The bilateral seventh and eighth   cranial nerves have normal signal intensity, morphology, and enhancement.   Bilateral cerebellopontine angle cisterns and internal auditory canals are "   normal without masses or fluid collections.     Small right mastoid effusion. Left mastoid air cells are clear.     Overview imaging through the brain shows chronic lacunar infarct in the left   periventricular white matter (series 4 image 28). There is mild amount of   chronic microangiopathy within the cerebral white matter.     There is 4 mm nonenhancing cystic lesion along the right superior aspect of the   pituitary gland (series 6 image 104). This is usually Rathke cleft cyst, though   could be incidental pituitary microadenoma.    I personally reviewed the above imaging and agree with the findings in the report--images are in PACS.               Assessment and Plan:   Assessment:  Tyler Barreto is a very pleasant 56 year old man who presents with chief complaint of headache.  This started after a rather profound viral illness that likely led to some otitis media requiring several rounds of antibiotics for possible sinus infection as well per patient report.  Since that time he has had right sided head pressure, pain and tinnitus. Work up via ENT and audiology has been unrevealing.  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  He may continue to slowly improve on his own but the mainstay of treatment would be supportive as below to try to give him good quality of life given essentially negative MRI w/w/o and ENT/audiology work up.  His symptoms are not in a distribution where this would be considered trigeminal neuralgia.     Plan:  --Start nortriptyline 25 mg at bedtime if tolerating well can go up to 50 mg at bedtime after 2-3 weeks.  He did not feel the gabapentin is helpful, will stop.  Theoretical interaction with trazodone, risk of serotonin syndrome very low at low doses, but would consider stopping trazodone concurrently.   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   --Follow up with me in 3 months can call or mychart with questions in the mean time.            Pop Michael MD   of Neurology   [facility]      The total time of this encounter today amounted to 46 minutes. This time included time spent with the patient, prep work, ordering tests, and performing post visit documentation.        Again, thank you for allowing me to participate in the care of your patient.        Sincerely,        Lakhwinder Michael MD

## 2022-01-28 NOTE — NURSING NOTE
"Tyler Barreto is a 56 year old male who presents for:  Chief Complaint   Patient presents with     Referral     Pain in the right side of head for approximately 2 years        Initial Vitals:  BP (!) 144/83   Pulse 73   Ht 1.93 m (6' 4\")   Wt 115.7 kg (255 lb)   SpO2 98%   BMI 31.04 kg/m   Estimated body mass index is 31.04 kg/m  as calculated from the following:    Height as of this encounter: 1.93 m (6' 4\").    Weight as of this encounter: 115.7 kg (255 lb).. Body surface area is 2.49 meters squared. BP completed using cuff size: regular    Nursing Comments: Patient advised to monitor BP.    Kizzy Solis    "

## 2022-05-06 ENCOUNTER — OFFICE VISIT (OUTPATIENT)
Dept: NEUROLOGY | Facility: CLINIC | Age: 57
End: 2022-05-06
Payer: COMMERCIAL

## 2022-05-06 VITALS
BODY MASS INDEX: 31.05 KG/M2 | WEIGHT: 255 LBS | DIASTOLIC BLOOD PRESSURE: 87 MMHG | RESPIRATION RATE: 16 BRPM | SYSTOLIC BLOOD PRESSURE: 133 MMHG | OXYGEN SATURATION: 99 % | HEART RATE: 77 BPM | HEIGHT: 76 IN

## 2022-05-06 DIAGNOSIS — H93.11 TINNITUS, RIGHT: ICD-10-CM

## 2022-05-06 DIAGNOSIS — G44.209 TENSION HEADACHE: Primary | ICD-10-CM

## 2022-05-06 DIAGNOSIS — R51.9 PRESSURE IN HEAD: ICD-10-CM

## 2022-05-06 PROCEDURE — 99214 OFFICE O/P EST MOD 30 MIN: CPT | Performed by: STUDENT IN AN ORGANIZED HEALTH CARE EDUCATION/TRAINING PROGRAM

## 2022-05-06 NOTE — PATIENT INSTRUCTIONS
Options include topiramate,  acetazolamide (pending CSF analsyis), tegretol (more of Trigeminal neuralgia approach) lyrica (super gabapentin)

## 2022-05-06 NOTE — PROGRESS NOTES
[facility]  Section of General Neurology  Return Patient Visit    Tyler Barreto MRN# [id number]   Age: 56 year old YOB: 1965     Brief history of symptoms: The patient was initially seen in neurologic consultation on 1/28/22 for evaluation of facial /head pain, fullness. Please see the comprehensive neurologic consultation note from that date in the Epic records for details.         Interval history:   Seen in follow up for pain/head pressure:   Nortriptiline 25-50 mg at bedtime, off of gabapentin:  This change in medication did not help him.    It is not really in the face, always a temple pressure but not really a pain.  He denies visual changes but is going to see an eye doctor soon.     Tried nasal sprays through ENT  Current pain is more in the ear, less in the sinuses, but he does suspect there may be a component/relationship with this and sinuses perhaps.   Still has inner ear pressure when driving, he notes as well.  Does not reliably worsen when supine.    It is the whole right temporal area when asked to show where the sensation is worst.    More of a dull ache, headaches more rare.    He is taking zyrtec twice a day.  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     Past Surgical History:     Past Surgical History:   Procedure Laterality Date     DISCECTOMY LUMBAR MINIMALLY INVASIVE TWO LEVELS Right 10/10/2017    Procedure: DISCECTOMY LUMBAR MINIMALLY INVASIVE TWO LEVELS;  Right Minimally Invasive Hemilaminectomy Lumbar 4-5, Lumbar 5-Sacral 1;  Surgeon: Jem Rocha MD;  Location:  OR        Social History:     Social History     Tobacco Use      "Smoking status: Passive Smoke Exposure - Never Smoker     Smokeless tobacco: Current User     Types: Chew     Tobacco comment: Declined quit help   Substance Use Topics     Alcohol use: Yes     Drug use: No        Family History:     Family History   Problem Relation Age of Onset     Diabetes Mother      Hypertension Mother      Eye Disorder Mother         CATARACTS     Breast Cancer Mother      Eye Disorder Father         CATARACTS     Other Cancer Father         Medications:     Current Outpatient Medications   Medication Sig     fluticasone (FLONASE) 50 MCG/ACT nasal spray  (Patient not taking: Reported on 1/28/2022)     gabapentin (NEURONTIN) 300 MG capsule  (Patient not taking: Reported on 1/28/2022)     nortriptyline (PAMELOR) 25 MG capsule Take 1 capsule (25 mg) by mouth At Bedtime     traZODone (DESYREL) 50 MG tablet Take 1-2 tablets ( mg) by mouth At Bedtime     No current facility-administered medications for this visit.        Allergies:     Allergies   Allergen Reactions     No Known Allergies           Physical Exam:   Vitals: /87   Pulse 77   Resp 16   Ht 1.93 m (6' 4\")   Wt 115.7 kg (255 lb)   SpO2 99%   BMI 31.04 kg/m     CV: peripheral pulse appreciated  Lungs: breathing comfortably  Extremities: no edema    Neuro:   General Appearance: No apparent distress, well-nourished, well-groomed, pleasant     Mental Status: Alert and oriented to person, place, and time. Speech fluent and comprehension intact. No dysarthria.     Cranial Nerves:   II: Visual fields: normal  III: Pupils: 3 mm, equal, round, reactive to light   III,IV,VI: Extraocular Movements: intact   V: Facial sensation: 20% change in feeling between R face vs left face to LT  No worsening of scalp tenderness of temporal area at this time to LT  VII: Facial strength: intact without asymmetry  VIII: Hearing: intact grossly  IX: Palate: intact      Motor Exam:   Upper Extremities  Deltoid  Bicep  Tricep  Wrist Extensors  " strength Intrinsic Muscles    Right  5  5  5  5 5 5    Left  5  5  5  5 5 5      Lower Extremities  Hip Flexors  Knee Extensors  Knee   Flexors  Dorsi Flexion  Plantar   Flexion    Right  5  5  5  5  5    Left  5  5  5  5  5        Coordination: no dysmetria seen    Reflexes: biceps, triceps, brachioradialis, patellar, and ankle jerks 1+ and symmetric. Toes are downgoing bilaterally    Gait: normal casual gait, normal stride length         Data: Pertinent prior to visit   [email or web address] MRI Brain w/w/o     EXAM: MR BRAIN WITHOUT AND WITH IV CONTRAST     COMPARISON: CT head 3/10/20.     FINDINGS: This is a MRI of the internal auditory canals. Negative for   retrocochlear lesion or vestibular schwannoma. The bilateral seventh and eighth   cranial nerves have normal signal intensity, morphology, and enhancement.   Bilateral cerebellopontine angle cisterns and internal auditory canals are   normal without masses or fluid collections.     Small right mastoid effusion. Left mastoid air cells are clear.     Overview imaging through the brain shows chronic lacunar infarct in the left   periventricular white matter (series 4 image 28). There is mild amount of   chronic microangiopathy within the cerebral white matter.     There is 4 mm nonenhancing cystic lesion along the right superior aspect of the   pituitary gland (series 6 image 104). This is usually Rathke cleft cyst, though   could be incidental pituitary microadenoma.     I personally reviewed the above imaging and agree with the findings in the report--images are in PACS.             Assessment and Plan:     Tyler Barreto is a very pleasant 56 year old man who presents in follow up for head pain/ear fullness.  This started after a rather profound viral illness that likely led to some otitis media requiring several rounds of antibiotics for possible sinus infection as well per patient report.  Since that time he has had right sided head pressure, pain and  tinnitus x many years. Work up via ENT and audiology has been unrevealing.  A prolonged post viral syndrome is possible, unclear if there was damage to the inner ear itself responsible, though unrevealing ENT/audiology work up make this less likely.   We discussed that given this pressure type of feeling in addition to the pain that has been refractory to multiple types of medications with negative MRIs in the past that I would recommend lumbar puncture to determine if this could be an intracranial pressure related phenomenon.  Pending these results could discuss different types of medications to trial including topiramate, acetazolamide (if pressure high/equivocal), lyrica, tegretol (if taking more of a TGN approach but feels less likely).  It is unfortunate these symptoms have been so refractory but hopefully we can find something that can provide him relief.               Pop Michael MD   of Neurology   [facility]      The total time of this encounter today amounted to 34 minutes. This time included time spent with the patient, prep work, ordering tests, and performing post visit documentation.

## 2022-05-06 NOTE — LETTER
5/6/2022         RE: Tyler Barreto  [address]  [place] [facility] [place] [address]        Dear Colleague,    Thank you for referring your patient, Tyler Barreto, to the [facility] NEUROLOGY CLINICS [facility]. Please see a copy of my visit note below.    [facility]  Section of General Neurology  Return Patient Visit    Tyler Barreto MRN# [id number]   Age: 56 year old YOB: 1965     Brief history of symptoms: The patient was initially seen in neurologic consultation on 1/28/22 for evaluation of facial /head pain, fullness. Please see the comprehensive neurologic consultation note from that date in the Epic records for details.         Interval history:   Seen in follow up for pain/head pressure:   Nortriptiline 25-50 mg at bedtime, off of gabapentin:  This change in medication did not help him.    It is not really in the face, always a temple pressure but not really a pain.  He denies visual changes but is going to see an eye doctor soon.     Tried nasal sprays through ENT  Current pain is more in the ear, less in the sinuses, but he does suspect there may be a component/relationship with this and sinuses perhaps.   Still has inner ear pressure when driving, he notes as well.  Does not reliably worsen when supine.    It is the whole right temporal area when asked to show where the sensation is worst.    More of a dull ache, headaches more rare.    He is taking zyrtec twice a day.  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     Past Surgical History:     Past Surgical History:   Procedure Laterality Date     DISCECTOMY LUMBAR MINIMALLY INVASIVE TWO LEVELS Right  "10/10/2017    Procedure: DISCECTOMY LUMBAR MINIMALLY INVASIVE TWO LEVELS;  Right Minimally Invasive Hemilaminectomy Lumbar 4-5, Lumbar 5-Sacral 1;  Surgeon: Jem Rocha MD;  Location:  OR        Social History:     Social History     Tobacco Use     Smoking status: Passive Smoke Exposure - Never Smoker     Smokeless tobacco: Current User     Types: Chew     Tobacco comment: Declined quit help   Substance Use Topics     Alcohol use: Yes     Drug use: No        Family History:     Family History   Problem Relation Age of Onset     Diabetes Mother      Hypertension Mother      Eye Disorder Mother         CATARACTS     Breast Cancer Mother      Eye Disorder Father         CATARACTS     Other Cancer Father         Medications:     Current Outpatient Medications   Medication Sig     fluticasone (FLONASE) 50 MCG/ACT nasal spray  (Patient not taking: Reported on 1/28/2022)     gabapentin (NEURONTIN) 300 MG capsule  (Patient not taking: Reported on 1/28/2022)     nortriptyline (PAMELOR) 25 MG capsule Take 1 capsule (25 mg) by mouth At Bedtime     traZODone (DESYREL) 50 MG tablet Take 1-2 tablets ( mg) by mouth At Bedtime     No current facility-administered medications for this visit.        Allergies:     Allergies   Allergen Reactions     No Known Allergies           Physical Exam:   Vitals: /87   Pulse 77   Resp 16   Ht 1.93 m (6' 4\")   Wt 115.7 kg (255 lb)   SpO2 99%   BMI 31.04 kg/m     CV: peripheral pulse appreciated  Lungs: breathing comfortably  Extremities: no edema    Neuro:   General Appearance: No apparent distress, well-nourished, well-groomed, pleasant     Mental Status: Alert and oriented to person, place, and time. Speech fluent and comprehension intact. No dysarthria.     Cranial Nerves:   II: Visual fields: normal  III: Pupils: 3 mm, equal, round, reactive to light   III,IV,VI: Extraocular Movements: intact   V: Facial sensation: 20% change in feeling between R face vs " left face to LT  No worsening of scalp tenderness of temporal area at this time to LT  VII: Facial strength: intact without asymmetry  VIII: Hearing: intact grossly  IX: Palate: intact      Motor Exam:   Upper Extremities  Deltoid  Bicep  Tricep  Wrist Extensors  strength Intrinsic Muscles    Right  5  5  5  5 5 5    Left  5  5  5  5 5 5      Lower Extremities  Hip Flexors  Knee Extensors  Knee   Flexors  Dorsi Flexion  Plantar   Flexion    Right  5  5  5  5  5    Left  5  5  5  5  5        Coordination: no dysmetria seen    Reflexes: biceps, triceps, brachioradialis, patellar, and ankle jerks 1+ and symmetric. Toes are downgoing bilaterally    Gait: normal casual gait, normal stride length         Data: Pertinent prior to visit   Imagin2020 MRI Brain w/w/o     EXAM: MR BRAIN WITHOUT AND WITH IV CONTRAST     COMPARISON: CT head 3/10/20.     FINDINGS: This is a MRI of the internal auditory canals. Negative for   retrocochlear lesion or vestibular schwannoma. The bilateral seventh and eighth   cranial nerves have normal signal intensity, morphology, and enhancement.   Bilateral cerebellopontine angle cisterns and internal auditory canals are   normal without masses or fluid collections.     Small right mastoid effusion. Left mastoid air cells are clear.     Overview imaging through the brain shows chronic lacunar infarct in the left   periventricular white matter (series 4 image 28). There is mild amount of   chronic microangiopathy within the cerebral white matter.     There is 4 mm nonenhancing cystic lesion along the right superior aspect of the   pituitary gland (series 6 image 104). This is usually Rathke cleft cyst, though   could be incidental pituitary microadenoma.     I personally reviewed the above imaging and agree with the findings in the report--images are in PACS.             Assessment and Plan:     Tyler Barreto is a very pleasant 56 year old man who presents in follow up for head  pain/ear fullness.  This started after a rather profound viral illness that likely led to some otitis media requiring several rounds of antibiotics for possible sinus infection as well per patient report.  Since that time he has had right sided head pressure, pain and tinnitus x many years. Work up via ENT and audiology has been unrevealing.  A prolonged post viral syndrome is possible, unclear if there was damage to the inner ear itself responsible, though unrevealing ENT/audiology work up make this less likely.   We discussed that given this pressure type of feeling in addition to the pain that has been refractory to multiple types of medications with negative MRIs in the past that I would recommend lumbar puncture to determine if this could be an intracranial pressure related phenomenon.  Pending these results could discuss different types of medications to trial including topiramate, acetazolamide (if pressure high/equivocal), lyrica, tegretol (if taking more of a TGN approach but feels less likely).  It is unfortunate these symptoms have been so refractory but hopefully we can find something that can provide him relief.               Pop Michael MD   of Neurology   [facility]      The total time of this encounter today amounted to 34 minutes. This time included time spent with the patient, prep work, ordering tests, and performing post visit documentation.        Again, thank you for allowing me to participate in the care of your patient.        Sincerely,        Lakhwinder Michael MD

## 2022-07-26 ENCOUNTER — OFFICE VISIT (OUTPATIENT)
Dept: NEUROLOGY | Facility: CLINIC | Age: 57
End: 2022-07-26
Payer: COMMERCIAL

## 2022-07-26 VITALS
OXYGEN SATURATION: 96 % | HEART RATE: 91 BPM | DIASTOLIC BLOOD PRESSURE: 80 MMHG | BODY MASS INDEX: 31.05 KG/M2 | SYSTOLIC BLOOD PRESSURE: 118 MMHG | WEIGHT: 255 LBS | HEIGHT: 76 IN

## 2022-07-26 DIAGNOSIS — G44.209 TENSION HEADACHE: Primary | ICD-10-CM

## 2022-07-26 DIAGNOSIS — H93.11 TINNITUS, RIGHT: ICD-10-CM

## 2022-07-26 DIAGNOSIS — R51.9 PRESSURE IN HEAD: ICD-10-CM

## 2022-07-26 PROCEDURE — 99214 OFFICE O/P EST MOD 30 MIN: CPT | Performed by: STUDENT IN AN ORGANIZED HEALTH CARE EDUCATION/TRAINING PROGRAM

## 2022-07-26 RX ORDER — ACETAZOLAMIDE 250 MG/1
500 TABLET ORAL 2 TIMES DAILY
Qty: 120 TABLET | Refills: 4 | Status: SHIPPED | OUTPATIENT
Start: 2022-07-26 | End: 2022-12-27

## 2022-07-26 NOTE — LETTER
7/26/2022         RE: Tyler Barreto  [address]  [place] [address]        Dear Colleague,    Thank you for referring your patient, Tyler Barreto, to the [facility] NEUROLOGY CLINICS [facility]. Please see a copy of my visit note below.    [facility]  Section of General Neurology  Return Patient Visit    Tyler Barreto MRN# [id number]   Age: 56 year old YOB: 1965           Interval history:     LP data: He did not end up wanting to this test, felt it would not be worth the risk/benefit.   Afrin opens things up, feels very congested and feels this is an ENT issue.    He has done allergy testing as well previously.   Notices ringing in both ears, ear fullness, seems to worsen with allergies.    He feels this is a primary ENT problem.    Discussed utility in repeating previously unrevealing MRI, reviewed previous MRI in PACS with Mr. Barreto, agree that this was unrevealing regarding a specific cause for his complaint.  I would be interested in repeating imaging in this regard, we settled on seeing what ENT thought about the utility of this.        Past Medical History:     Patient Active Problem List   Diagnosis     Enthesopathy     Insomnia     Osteoarthritis of spine with radiculopathy, lumbar region     Right L5-S1 foraminal stenosis     Right disc herniation L4-5     Radiculopathy R L5     No past medical history on file.     Past Surgical History:     Past Surgical History:   Procedure Laterality Date     DISCECTOMY LUMBAR MINIMALLY INVASIVE TWO LEVELS Right 10/10/2017    Procedure: DISCECTOMY LUMBAR MINIMALLY INVASIVE TWO LEVELS;  Right Minimally Invasive Hemilaminectomy Lumbar 4-5, Lumbar 5-Sacral 1;  Surgeon: Jem oRcha MD;  Location:  OR        Social History:     Social History     Tobacco Use     Smoking status: Passive Smoke Exposure - Never Smoker     Smokeless tobacco: Current User     Types: Chew     Tobacco comment: Declined  "quit help   Substance Use Topics     Alcohol use: Yes     Drug use: No        Family History:     Family History   Problem Relation Age of Onset     Diabetes Mother      Hypertension Mother      Eye Disorder Mother         CATARACTS     Breast Cancer Mother      Eye Disorder Father         CATARACTS     Other Cancer Father         Medications:     Current Outpatient Medications   Medication Sig     fluticasone (FLONASE) 50 MCG/ACT nasal spray  (Patient not taking: Reported on 1/28/2022)     gabapentin (NEURONTIN) 300 MG capsule  (Patient not taking: Reported on 1/28/2022)     nortriptyline (PAMELOR) 25 MG capsule Take 1 capsule (25 mg) by mouth At Bedtime     traZODone (DESYREL) 50 MG tablet Take 1-2 tablets ( mg) by mouth At Bedtime     No current facility-administered medications for this visit.        Allergies:     Allergies   Allergen Reactions     No Known Allergies           Physical Exam:   Vitals: /80   Pulse 91   Ht 1.93 m (6' 4\")   Wt 115.7 kg (255 lb)   SpO2 96%   BMI 31.04 kg/m       Neuro:   General Appearance: No apparent distress, well-nourished, well-groomed, pleasant     Mental Status: Alert and oriented to person, place, and time. Speech fluent and comprehension intact. No dysarthria    Cranial Nerves:   II: Visual fields: normal  III: Pupils: 3 mm, equal, round, reactive to light   III,IV,VI: Extraocular Movements: intact   V: Facial sensation: intact to light touch, no clear changes in this distribution  VII: Facial strength: intact without asymmetry  VIII: Hearing: intact grossly    Motor Exam:   5/5 diffusely    Sensory: intact to light touch    Reflexes: biceps, triceps, brachioradialis, patellar, and ankle jerks 2+ and symmetric. Toes are downgoing bilaterally           Data: Pertinent prior to visit   5/21/2020 MRI Brain w/w/o     EXAM: MR BRAIN WITHOUT AND WITH IV CONTRAST     COMPARISON: CT head 3/10/20.     FINDINGS: This is a MRI of the internal auditory canals. " Negative for   retrocochlear lesion or vestibular schwannoma. The bilateral seventh and eighth   cranial nerves have normal signal intensity, morphology, and enhancement.   Bilateral cerebellopontine angle cisterns and internal auditory canals are   normal without masses or fluid collections.     Small right mastoid effusion. Left mastoid air cells are clear.     Overview imaging through the brain shows chronic lacunar infarct in the left   periventricular white matter (series 4 image 28). There is mild amount of   chronic microangiopathy within the cerebral white matter.     There is 4 mm nonenhancing cystic lesion along the right superior aspect of the   pituitary gland (series 6 image 104). This is usually Rathke cleft cyst, though   could be incidental pituitary microadenoma.     I personally reviewed the above imaging and agree with the findings in the report--images are in PACS.             Assessment and Plan:   Tyler Barreto is a very pleasant 56 year old man who presents in follow up for head pain/ear fullness.  This started after a rather profound viral illness that likely led to some otitis media requiring several rounds of antibiotics for possible sinus infection as well per patient report.  Since that time he has had right sided head pressure, pain and tinnitus x many years. Work up via ENT and audiology has been unrevealing.  A prolonged post viral syndrome is possible, unclear if there was damage to the inner ear itself responsible.   He did not end up wanting to undergo lumbar puncture which I felt would have been a good next step for head fullness/tinnitus though the tinnitus is not clearly pulsatile consistently.  He feels it is responsive to treatments such as afrin/treating like congestion/an ENT problem.  If so I think ENT second opinion would be lara.  On the chance it could be related to ICP I did suggest empiric diamox, side effects discussed, if he does not feel comfortable with LP.  Also  discussed possibility of repeating imaging but will await ENT's opinion in this regard.  All questions answered.  Follow up in ~4-6 months or PRN depending on how he is doing.                 Pop Michael MD   of Neurology   [facility]      The total time of this encounter today amounted to 30 minutes. This time included time spent with the patient, prep work, ordering tests, and performing post visit documentation.        Again, thank you for allowing me to participate in the care of your patient.        Sincerely,        Lakhwinder Michael MD

## 2022-07-26 NOTE — PROGRESS NOTES
[facility]/[place]  Section of General Neurology  Return Patient Visit    Tyler Barreto MRN# [id number]   Age: 56 year old YOB: 1965           Interval history:     LP data: He did not end up wanting to this test, felt it would not be worth the risk/benefit.   Afrin opens things up, feels very congested and feels this is an ENT issue.    He has done allergy testing as well previously.   Notices ringing in both ears, ear fullness, seems to worsen with allergies.    He feels this is a primary ENT problem.    Discussed utility in repeating previously unrevealing MRI, reviewed previous MRI in PACS with Mr. Barreto, agree that this was unrevealing regarding a specific cause for his complaint.  I would be interested in repeating imaging in this regard, we settled on seeing what ENT thought about the utility of this.        Past Medical History:     Patient Active Problem List   Diagnosis     Enthesopathy     Insomnia     Osteoarthritis of spine with radiculopathy, lumbar region     Right L5-S1 foraminal stenosis     Right disc herniation L4-5     Radiculopathy R L5     No past medical history on file.     Past Surgical History:     Past Surgical History:   Procedure Laterality Date     DISCECTOMY LUMBAR MINIMALLY INVASIVE TWO LEVELS Right 10/10/2017    Procedure: DISCECTOMY LUMBAR MINIMALLY INVASIVE TWO LEVELS;  Right Minimally Invasive Hemilaminectomy Lumbar 4-5, Lumbar 5-Sacral 1;  Surgeon: Jem Rocha MD;  Location:  OR        Social History:     Social History     Tobacco Use     Smoking status: Passive Smoke Exposure - Never Smoker     Smokeless tobacco: Current User     Types: Chew     Tobacco comment: Declined quit help   Substance Use Topics     Alcohol use: Yes     Drug use: No        Family History:     Family History   Problem Relation Age of Onset     Diabetes Mother      Hypertension Mother      Eye Disorder Mother         CATARACTS     Breast Cancer Mother       "Eye Disorder Father         CATARACTS     Other Cancer Father         Medications:     Current Outpatient Medications   Medication Sig     fluticasone (FLONASE) 50 MCG/ACT nasal spray  (Patient not taking: Reported on 1/28/2022)     gabapentin (NEURONTIN) 300 MG capsule  (Patient not taking: Reported on 1/28/2022)     nortriptyline (PAMELOR) 25 MG capsule Take 1 capsule (25 mg) by mouth At Bedtime     traZODone (DESYREL) 50 MG tablet Take 1-2 tablets ( mg) by mouth At Bedtime     No current facility-administered medications for this visit.        Allergies:     Allergies   Allergen Reactions     No Known Allergies           Physical Exam:   Vitals: /80   Pulse 91   Ht 1.93 m (6' 4\")   Wt 115.7 kg (255 lb)   SpO2 96%   BMI 31.04 kg/m       Neuro:   General Appearance: No apparent distress, well-nourished, well-groomed, pleasant     Mental Status: Alert and oriented to person, place, and time. Speech fluent and comprehension intact. No dysarthria    Cranial Nerves:   II: Visual fields: normal  III: Pupils: 3 mm, equal, round, reactive to light   III,IV,VI: Extraocular Movements: intact   V: Facial sensation: intact to light touch, no clear changes in this distribution  VII: Facial strength: intact without asymmetry  VIII: Hearing: intact grossly    Motor Exam:   5/5 diffusely    Sensory: intact to light touch    Reflexes: biceps, triceps, brachioradialis, patellar, and ankle jerks 2+ and symmetric. Toes are downgoing bilaterally           Data: Pertinent prior to visit   5/21/2020 MRI Brain w/w/o     EXAM: MR BRAIN WITHOUT AND WITH IV CONTRAST     COMPARISON: CT head 3/10/20.     FINDINGS: This is a MRI of the internal auditory canals. Negative for   retrocochlear lesion or vestibular schwannoma. The bilateral seventh and eighth   cranial nerves have normal signal intensity, morphology, and enhancement.   Bilateral cerebellopontine angle cisterns and internal auditory canals are   normal without " masses or fluid collections.     Small right mastoid effusion. Left mastoid air cells are clear.     Overview imaging through the brain shows chronic lacunar infarct in the left   periventricular white matter (series 4 image 28). There is mild amount of   chronic microangiopathy within the cerebral white matter.     There is 4 mm nonenhancing cystic lesion along the right superior aspect of the   pituitary gland (series 6 image 104). This is usually Rathke cleft cyst, though   could be incidental pituitary microadenoma.     I personally reviewed the above imaging and agree with the findings in the report--images are in PACS.             Assessment and Plan:   Tyler Barreto is a very pleasant 56 year old man who presents in follow up for head pain/ear fullness.  This started after a rather profound viral illness that likely led to some otitis media requiring several rounds of antibiotics for possible sinus infection as well per patient report.  Since that time he has had right sided head pressure, pain and tinnitus x many years. Work up via ENT and audiology has been unrevealing.  A prolonged post viral syndrome is possible, unclear if there was damage to the inner ear itself responsible.   He did not end up wanting to undergo lumbar puncture which I felt would have been a good next step for head fullness/tinnitus though the tinnitus is not clearly pulsatile consistently.  He feels it is responsive to treatments such as afrin/treating like congestion/an ENT problem.  If so I think ENT second opinion would be lara.  On the chance it could be related to ICP I did suggest empiric diamox, side effects discussed, if he does not feel comfortable with LP.  Also discussed possibility of repeating imaging but will await ENT's opinion in this regard.  All questions answered.  Follow up in ~4-6 months or PRN depending on how he is doing.                 Pop Michael MD   of Neurology   [facility]  [facility]      The total time of this encounter today amounted to 30 minutes. This time included time spent with the patient, prep work, ordering tests, and performing post visit documentation.

## 2022-07-26 NOTE — NURSING NOTE
"Tyler Barreto is a 56 year old male who presents for:  Chief Complaint   Patient presents with     Follow Up     Headache        Initial Vitals:  /80   Pulse 91   Ht 1.93 m (6' 4\")   Wt 115.7 kg (255 lb)   SpO2 96%   BMI 31.04 kg/m   Estimated body mass index is 31.04 kg/m  as calculated from the following:    Height as of this encounter: 1.93 m (6' 4\").    Weight as of this encounter: 115.7 kg (255 lb).. Body surface area is 2.49 meters squared. BP completed using cuff size: larg    Kizzy Solis    "

## 2022-07-26 NOTE — PATIENT INSTRUCTIONS
Diamox--250 mg BID x1 week then 500 mg twice a day and let me know if tolerated can go higher.  Could help if increased intracranial pressure a component    Continue anti histamine/afrin.      See ENT regarding MRI utility

## 2022-09-22 ENCOUNTER — OFFICE VISIT (OUTPATIENT)
Dept: AUDIOLOGY | Facility: CLINIC | Age: 57
End: 2022-09-22
Attending: STUDENT IN AN ORGANIZED HEALTH CARE EDUCATION/TRAINING PROGRAM
Payer: COMMERCIAL

## 2022-09-22 ENCOUNTER — OFFICE VISIT (OUTPATIENT)
Dept: OTOLARYNGOLOGY | Facility: CLINIC | Age: 57
End: 2022-09-22
Attending: STUDENT IN AN ORGANIZED HEALTH CARE EDUCATION/TRAINING PROGRAM
Payer: COMMERCIAL

## 2022-09-22 DIAGNOSIS — H90.3 SENSORINEURAL HEARING LOSS, BILATERAL: Primary | ICD-10-CM

## 2022-09-22 DIAGNOSIS — H93.8X1 SENSATION OF FULLNESS IN RIGHT EAR: ICD-10-CM

## 2022-09-22 DIAGNOSIS — R44.8 FACIAL PRESSURE: ICD-10-CM

## 2022-09-22 DIAGNOSIS — R09.81 NASAL CONGESTION: Primary | ICD-10-CM

## 2022-09-22 DIAGNOSIS — H93.11 TINNITUS OF RIGHT EAR: ICD-10-CM

## 2022-09-22 DIAGNOSIS — H93.11 TINNITUS, RIGHT: ICD-10-CM

## 2022-09-22 PROCEDURE — 99204 OFFICE O/P NEW MOD 45 MIN: CPT | Performed by: OTOLARYNGOLOGY

## 2022-09-22 PROCEDURE — 92550 TYMPANOMETRY & REFLEX THRESH: CPT | Performed by: AUDIOLOGIST

## 2022-09-22 PROCEDURE — 92557 COMPREHENSIVE HEARING TEST: CPT | Performed by: AUDIOLOGIST

## 2022-09-22 RX ORDER — SILDENAFIL 100 MG/1
TABLET, FILM COATED ORAL
COMMUNITY
Start: 2021-12-07

## 2022-09-22 RX ORDER — CETIRIZINE HYDROCHLORIDE 10 MG/1
10 TABLET ORAL DAILY
COMMUNITY

## 2022-09-22 RX ORDER — CROMOLYN SODIUM 5.2 MG
2 AEROSOL, SPRAY WITH PUMP (ML) NASAL 2 TIMES DAILY PRN
Qty: 13 ML | Refills: 3 | Status: SHIPPED | OUTPATIENT
Start: 2022-09-22 | End: 2022-10-22

## 2022-09-22 RX ORDER — OXYMETAZOLINE HYDROCHLORIDE 0.05 G/100ML
2 SPRAY NASAL PRN
COMMUNITY

## 2022-09-22 NOTE — NURSING NOTE
Sino-Nasal Outcome Test (SNOT - 22)    1. Need to Blow Nose: (P) Moderate  2. Nasal Blockage: (P) Mild or slight  3. Sneezing: (P) Very mild  4. Runny Nose: (P) Mild or slight  5. Cough: (P) None  6. Post-nasal discharge: (P) Very mild  7. Thick nasal discharge: (P) None  8. Ear fullness: (P) Moderate  9. Dizziness: (P) Very mild  10. Ear Pain: (P) Very mild  11. Facial pain/pressure: (P) Mild or slight  12. Decreased Sense of Smell/Taste: (P) None  13. Difficulty falling asleep: (P) None  14. Wake up at night: (P) None  15. Lack of a good night's sleep: (P) None  16. Wake up tired: (P) None  17. Fatigue: (P) Mild or slight  18. Reduced Productivity: (P) Mild or slight  19. Reduced Concentration: (P) None  20. Frustrated/restless/irritable: (P) Moderate  21. Sad: (P) Moderate  22. Embarrassed: (P) None    Total Score: (P) 26    COPYRIGHT 1996. [facility] [place] . [facility],[place]    Radha Mcclure on 9/22/2022 at 12:50 PM

## 2022-09-22 NOTE — PATIENT INSTRUCTIONS
CT SINUS order placed today  Allergy referral placed today  Return visit 6-8 weeks at [facility]

## 2022-09-22 NOTE — PROGRESS NOTES
AUDIOLOGY REPORT    SUMMARY: Audiology visit completed. See audiogram for results. Abuse screening not completed due to same day appt with ENT clinic, where this is addressed.      RECOMMENDATIONS: Follow-up with ENT.      Joao Flood, [facility]A  [place] Licensed Audiologist [id number]

## 2022-09-22 NOTE — PROGRESS NOTES
CHIEF COMPLAINT: Patient presents with:  Tinnitus: Tinnitus in rt ear 2 and half years was being seen at [facility], has pressure on that side of his face and has had allergy testing and takes 2-4 zyrtec a day and that helps take the edge off takes afrin to clear up his sinuses          HISTORY OF PRESENT ILLNESS    Tyler was seen at the behest of Lakhwinder Michael MD for tinnitus and facial pain.  He has been seen by neurology and failed several medications.  He has been having symptoms since a Flu like ilness in October of 2020.   Allergy testing in [place] negative.   Seen by Dr. Vergara for myringitis.        ENT visit 2021 with Jona Vergara    ASSESSMENT / PLAN     #1 Pressure Ear Right  #2 Myringitis Acute Right  #3 Tinnitus Subjective Right  #4 Loss Hearing Sensorineural Asymmetrical  #5 Congestion Nasal  - budesonide (Pulmicort) 0.5 mg/2 mL nebulizer solution; Mix 1 ampule in 8 oz sinus irrigation bottle and irrigate once daily. Primarily to right nasal cavity, Normal  #6 Otitis Media Acute Right  Other orders  - Otorhinolaryngology office visit (clinic)  - Audiology evaluation; Future; Expected date: 08/03/2021  - Otorhinolaryngology office visit (clinic); Future; Expected date: 08/03/2021 (After tests)        Sino-Nasal Outcome Test (SNOT - 22)    1. Need to Blow Nose: (P) Moderate  2. Nasal Blockage: (P) Mild or slight  3. Sneezing: (P) Very mild  4. Runny Nose: (P) Mild or slight  5. Cough: (P) None  6. Post-nasal discharge: (P) Very mild  7. Thick nasal discharge: (P) None  8. Ear fullness: (P) Moderate  9. Dizziness: (P) Very mild  10. Ear Pain: (P) Very mild  11. Facial pain/pressure: (P) Mild or slight  12. Decreased Sense of Smell/Taste: (P) None  13. Difficulty falling asleep: (P) None  14. Wake up at night: (P) None  15. Lack of a good night's sleep: (P) None  16. Wake up tired: (P) None  17. Fatigue: (P) Mild or slight  18. Reduced Productivity: (P) Mild or slight  19. Reduced Concentration: (P) None  20.  Frustrated/restless/irritable: (P) Moderate  21. Sad: (P) Moderate  22. Embarrassed: (P) None    Total Score: (P) 26    COPYRIGHT 1996. [facility] [place], [place]      Referral note:    LP data: He did not end up wanting to this test, felt it would not be worth the risk/benefit.   Afrin opens things up, feels very congested and feels this is an ENT issue.    He has done allergy testing as well previously.   Notices ringing in both ears, ear fullness, seems to worsen with allergies.     He feels this is a primary ENT problem.    Discussed utility in repeating previously unrevealing MRI, reviewed previous MRI in PACS with Mr. Barreto, agree that this was unrevealing regarding a specific cause for his complaint.  I would be interested in repeating imaging in this regard, we settled on seeing what ENT thought about the utility of this       REVIEW OF SYSTEMS    Review of Systems as per HPI and PMHx, otherwise 10 system review system are negative.     No known allergies     There were no vitals taken for this visit.    HEAD: Normal appearance and symmetry:  No cutaneous lesions.      NECK:  supple     EARS: normal TM, EACs    EYES:  EOMI    CN VII/XII:  intact     NOSE:     Dorsum:   straight  Septum:  midline  Mucosa:  moist        ORAL CAVITY/OROPHARYNX:     Lips:  Normal.  Tongue: normal, midline  Mucosa:   no lesions     NECK:  Trachea:  midline.              Thyroid:  normal              Adenopathy:  none        NEURO:   Alert and Oriented     GAIT AND STATION:  normal     RESPIRATORY:   Symmetry and Respiratory effort     PSYCH:  Normal mood and affect     SKIN:   warm and dry         IMPRESSION:    Encounter Diagnoses   Name Primary?     Tinnitus, right      Nasal congestion Yes     Facial pressure      RECOMMENDATIONS:    Orders Placed This Encounter   Procedures     CT Sinus w/o Contrast     Adult Allergy/Asthma Referral

## 2022-09-22 NOTE — LETTER
9/22/2022         RE: Tyler Barreto  [address]  [place] [facility] [address]        Dear Colleague,    Thank you for referring your patient, Tyler Barreto, to the [facility]. Please see a copy of my visit note below.    CHIEF COMPLAINT: Patient presents with:  Tinnitus: Tinnitus in rt ear 2 and half years was being seen at [facility], has pressure on that side of his face and has had allergy testing and takes 2-4 zyrtec a day and that helps take the edge off takes afrin to clear up his sinuses          HISTORY OF PRESENT ILLNESS    Tyler was seen at the behest of Lakhwinder Michael MD for tinnitus and facial pain.  He has been seen by neurology and failed several medications.  He has been having symptoms since a Flu like ilness in October of 2020.   Allergy testing in [place] negative.   Seen by Dr. Vergara for myringitis.        ENT visit 2021 with Jona Vergara    ASSESSMENT / PLAN     #1 Pressure Ear Right  #2 Myringitis Acute Right  #3 Tinnitus Subjective Right  #4 Loss Hearing Sensorineural Asymmetrical  #5 Congestion Nasal  - budesonide (Pulmicort) 0.5 mg/2 mL nebulizer solution; Mix 1 ampule in 8 oz sinus irrigation bottle and irrigate once daily. Primarily to right nasal cavity, Normal  #6 Otitis Media Acute Right  Other orders  - Otorhinolaryngology office visit (clinic)  - Audiology evaluation; Future; Expected date: 08/03/2021  - Otorhinolaryngology office visit (clinic); Future; Expected date: 08/03/2021 (After tests)        Sino-Nasal Outcome Test (SNOT - 22)    1. Need to Blow Nose: (P) Moderate  2. Nasal Blockage: (P) Mild or slight  3. Sneezing: (P) Very mild  4. Runny Nose: (P) Mild or slight  5. Cough: (P) None  6. Post-nasal discharge: (P) Very mild  7. Thick nasal discharge: (P) None  8. Ear fullness: (P) Moderate  9. Dizziness: (P) Very mild  10. Ear Pain: (P) Very mild  11. Facial pain/pressure: (P) Mild or slight  12. Decreased Sense of Smell/Taste: (P) None  13.  Difficulty falling asleep: (P) None  14. Wake up at night: (P) None  15. Lack of a good night's sleep: (P) None  16. Wake up tired: (P) None  17. Fatigue: (P) Mild or slight  18. Reduced Productivity: (P) Mild or slight  19. Reduced Concentration: (P) None  20. Frustrated/restless/irritable: (P) Moderate  21. Sad: (P) Moderate  22. Embarrassed: (P) None    Total Score: (P) 26    COPYRIGHT 1996. [facility] [place], [place]      Referral note:    LP data: He did not end up wanting to this test, felt it would not be worth the risk/benefit.   Afrin opens things up, feels very congested and feels this is an ENT issue.    He has done allergy testing as well previously.   Notices ringing in both ears, ear fullness, seems to worsen with allergies.     He feels this is a primary ENT problem.    Discussed utility in repeating previously unrevealing MRI, reviewed previous MRI in PACS with Mr. Barreto, agree that this was unrevealing regarding a specific cause for his complaint.  I would be interested in repeating imaging in this regard, we settled on seeing what ENT thought about the utility of this       REVIEW OF SYSTEMS    Review of Systems as per HPI and PMHx, otherwise 10 system review system are negative.     No known allergies     There were no vitals taken for this visit.    HEAD: Normal appearance and symmetry:  No cutaneous lesions.      NECK:  supple     EARS: normal TM, EACs    EYES:  EOMI    CN VII/XII:  intact     NOSE:     Dorsum:   straight  Septum:  midline  Mucosa:  moist        ORAL CAVITY/OROPHARYNX:     Lips:  Normal.  Tongue: normal, midline  Mucosa:   no lesions     NECK:  Trachea:  midline.              Thyroid:  normal              Adenopathy:  none        NEURO:   Alert and Oriented     GAIT AND STATION:  normal     RESPIRATORY:   Symmetry and Respiratory effort     PSYCH:  Normal mood and affect     SKIN:   warm and dry         IMPRESSION:    Encounter Diagnoses   Name Primary?      Tinnitus, right      Nasal congestion Yes     Facial pressure      RECOMMENDATIONS:    Orders Placed This Encounter   Procedures     CT Sinus w/o Contrast     Adult Allergy/Asthma Referral       Again, thank you for allowing me to participate in the care of your patient.        Sincerely,        Mayco Price MD

## 2022-11-04 ENCOUNTER — HOSPITAL ENCOUNTER (OUTPATIENT)
Dept: CT IMAGING | Facility: HOSPITAL | Age: 57
Discharge: HOME | End: 2022-11-04
Attending: OTOLARYNGOLOGY | Admitting: OTOLARYNGOLOGY
Payer: COMMERCIAL

## 2022-11-04 DIAGNOSIS — R09.81 NASAL CONGESTION: ICD-10-CM

## 2022-11-04 PROCEDURE — 70486 CT MAXILLOFACIAL W/O DYE: CPT

## 2022-11-20 ENCOUNTER — HEALTH MAINTENANCE LETTER (OUTPATIENT)
Age: 57
End: 2022-11-20

## 2022-11-23 ENCOUNTER — OFFICE VISIT (OUTPATIENT)
Dept: OTOLARYNGOLOGY | Facility: CLINIC | Age: 57
End: 2022-11-23
Payer: COMMERCIAL

## 2022-11-23 DIAGNOSIS — H93.8X1 EAR FULLNESS, RIGHT: ICD-10-CM

## 2022-11-23 DIAGNOSIS — H93.11 TINNITUS, RIGHT: Primary | ICD-10-CM

## 2022-11-23 PROCEDURE — 99214 OFFICE O/P EST MOD 30 MIN: CPT | Performed by: OTOLARYNGOLOGY

## 2022-11-23 NOTE — LETTER
11/23/2022         RE: Tyler Barreto  [address]  [place] [address]        Dear Colleague,    Thank you for referring your patient, Tyler Barreto, to the [facility]. Please see a copy of my visit note below.    CHIEF COMPLAINT:  Patient presents with:  Follow Up: Follow up CT Review, 11/04/2022         HISTORY OF PRESENT ILLNESS    Tyler was seen in follow up after previous Visit date not found visit for CT SINUS review.    Sino-Nasal Outcome Test (SNOT - 22)    1. Need to Blow Nose: (P) Moderate  2. Nasal Blockage: (P) Moderate  3. Sneezing: (P) Very mild  4. Runny Nose: (P) Moderate  5. Cough: (P) Mild or slight  6. Post-nasal discharge: (P) Very mild  7. Thick nasal discharge: (P) Very mild  8. Ear fullness: (P) Moderate  9. Dizziness: (P) Very mild  10. Ear Pain: (P) Very mild  11. Facial pain/pressure: (P) Mild or slight  12. Decreased Sense of Smell/Taste: (P) None  13. Difficulty falling asleep: (P) None  14. Wake up at night: (P) None  15. Lack of a good night's sleep: (P) None  16. Wake up tired: (P) Very mild  17. Fatigue: (P) Mild or slight  18. Reduced Productivity: (P) Mild or slight  19. Reduced Concentration: (P) None  20. Frustrated/restless/irritable: (P) Moderate  21. Sad: (P) Moderate  22. Embarrassed: (P) None    Total Score: (P) 32    COPYRIGHT 1996. [facility] [place]        REVIEW OF SYSTEMS    Review of Systems: a 10-system review is reviewed at this encounter.  See scanned document.       Allergies   Allergen Reactions     No Known Allergies            PHYSICAL EXAM:        HEAD: Normal appearance and symmetry:  No cutaneous lesions.      EARS:   Auricles normal     NOSE:    Dorsum:   straight       ORAL CAVITY/OROPHARYNX:    Lips:  Normal.     NECK:  Trachea:  midline     NEURO:   Alert and Oriented    GAIT AND STATION:  normal     RESPIRATORY:   Symmetry and Respiratory effort    PSYCH:   normal mood and affect    SKIN:   warm and dry         IMPRESSION:   Encounter Diagnoses   Name Primary?     Tinnitus, right Yes     Ear fullness, right        RECOMMENDATIONS:    Continue nasal spray  ABR testing  Allergy evalution  Follow up as needed        Again, thank you for allowing me to participate in the care of your patient.        Sincerely,        Mayco Price MD

## 2022-11-23 NOTE — NURSING NOTE
Sino-Nasal Outcome Test (SNOT - 22)    1. Need to Blow Nose: (P) Moderate  2. Nasal Blockage: (P) Moderate  3. Sneezing: (P) Very mild  4. Runny Nose: (P) Moderate  5. Cough: (P) Mild or slight  6. Post-nasal discharge: (P) Very mild  7. Thick nasal discharge: (P) Very mild  8. Ear fullness: (P) Moderate  9. Dizziness: (P) Very mild  10. Ear Pain: (P) Very mild  11. Facial pain/pressure: (P) Mild or slight  12. Decreased Sense of Smell/Taste: (P) None  13. Difficulty falling asleep: (P) None  14. Wake up at night: (P) None  15. Lack of a good night's sleep: (P) None  16. Wake up tired: (P) Very mild  17. Fatigue: (P) Mild or slight  18. Reduced Productivity: (P) Mild or slight  19. Reduced Concentration: (P) None  20. Frustrated/restless/irritable: (P) Moderate  21. Sad: (P) Moderate  22. Embarrassed: (P) None    Total Score: (P) 32    COPYRIGHT 1996. [facility] [place],[place]    Madiha Nichols on 11/23/2022 at 11:27 AM

## 2022-11-23 NOTE — PROGRESS NOTES
CHIEF COMPLAINT:  Patient presents with:  Follow Up: Follow up CT Review, 11/04/2022         HISTORY OF PRESENT ILLNESS    Tyler was seen in follow up after previous Visit date not found visit for CT SINUS review.    Sino-Nasal Outcome Test (SNOT - 22)    1. Need to Blow Nose: (P) Moderate  2. Nasal Blockage: (P) Moderate  3. Sneezing: (P) Very mild  4. Runny Nose: (P) Moderate  5. Cough: (P) Mild or slight  6. Post-nasal discharge: (P) Very mild  7. Thick nasal discharge: (P) Very mild  8. Ear fullness: (P) Moderate  9. Dizziness: (P) Very mild  10. Ear Pain: (P) Very mild  11. Facial pain/pressure: (P) Mild or slight  12. Decreased Sense of Smell/Taste: (P) None  13. Difficulty falling asleep: (P) None  14. Wake up at night: (P) None  15. Lack of a good night's sleep: (P) None  16. Wake up tired: (P) Very mild  17. Fatigue: (P) Mild or slight  18. Reduced Productivity: (P) Mild or slight  19. Reduced Concentration: (P) None  20. Frustrated/restless/irritable: (P) Moderate  21. Sad: (P) Moderate  22. Embarrassed: (P) None    Total Score: (P) 32    COPYRIGHT 1996. [facility] [place] . [facility],[place]        REVIEW OF SYSTEMS    Review of Systems: a 10-system review is reviewed at this encounter.  See scanned document.       Allergies   Allergen Reactions     No Known Allergies            PHYSICAL EXAM:        HEAD: Normal appearance and symmetry:  No cutaneous lesions.      EARS:   Auricles normal     NOSE:    Dorsum:   straight       ORAL CAVITY/OROPHARYNX:    Lips:  Normal.     NECK:  Trachea:  midline     NEURO:   Alert and Oriented    GAIT AND STATION:  normal     RESPIRATORY:   Symmetry and Respiratory effort    PSYCH:   normal mood and affect    SKIN:  warm and dry         IMPRESSION:   Encounter Diagnoses   Name Primary?     Tinnitus, right Yes     Ear fullness, right        RECOMMENDATIONS:    Continue nasal spray  ABR testing  Allergy evalution  Follow up as needed

## 2022-12-14 ENCOUNTER — OFFICE VISIT (OUTPATIENT)
Dept: AUDIOLOGY | Facility: CLINIC | Age: 57
End: 2022-12-14
Payer: COMMERCIAL

## 2022-12-14 DIAGNOSIS — H93.8X1 EAR FULLNESS, RIGHT: ICD-10-CM

## 2022-12-14 DIAGNOSIS — H93.11 TINNITUS, RIGHT: ICD-10-CM

## 2022-12-14 PROCEDURE — 92567 TYMPANOMETRY: CPT | Performed by: AUDIOLOGIST

## 2022-12-14 PROCEDURE — 92653 AEP NEURODIAGNOSTIC I&R: CPT | Performed by: AUDIOLOGIST

## 2022-12-14 NOTE — Clinical Note
Hi Dr. Price, Today I saw Tyler for a neurodiagnostic ABR and results present evidence of possible retrocochlear pathology in all rate conditions. I informed the patient that you would contact them to discuss today's results and to discuss the next steps in their plan of care. Please let me know if you would like to discuss this further.   The patient prefers to be contacted via [employer].  Kindly, Roldan Blunt, CCC-A

## 2022-12-14 NOTE — PROGRESS NOTES
Audiology Report:    Referring Provider:  Mayco Price MD     SUBJECTIVE: Tyler Barreto, 57 year old male, was seen on 12/14/2022 for a neurodiagnostic Auditory Brainstem Response (ABR) evaluation. Tyler's hearing was last assessed on 9/22/22 and results revealed normal hearing from 250-4000 Hz sloping to mild sensorineural hearing loss bilaterally The patient has concerns of right fullness and tinnitus which worsens when he is experiencing seasonal allergies. His symptoms improve when using nasal spray although they are still present. Tyler states that he has an MRI of his head a few years ago.     OBJECTIVE: Otoscopy revealed clear, long canals bilaterally. Tympanograms showed normal middle ear function bilaterally with very slight negative pressure (-109 daPa) in the right ear.     Neurodiagnostic protocol completed on the [employer] Eclipse EP-25 system. Absolute wave, interwave, and interaural latency differences assessed. Interaural interpeak (I-III or I-V) latency differences exceeding 0.40 ms or absent wave III and/or wave V, should be interpreted as evidence of possible retrocochlear pathology. Click stimuli at 90 dBnHL completed at various rates (21.1, 11.1, and 71.1/sec). Electrode impedances were within appropriate tolerances. Attempts to improve responses included repositioning the patient, switching between ears to prevent nerve fatigue, and utilizing jumbo-sized inserts. Tracings using regular-sized inserts were used for interpretation. It took several tracings to obtain repeatable waveforms; therefore, repeatability and morphology is judged as fair. Initial responses in the 21.1/sec trial revealed normal latencies in the right ear (ear of concern) and delayed latencies in the left ear which was unexpected. Wave III in the right ear has reduced amplitude compared to the adjacent waves and the amplitudes in the left ear.       Rate clicks/ sec. I-III difference R/L I-V difference R/L  Interpretation   21.1  .57 .57 Abnormal   11.1  .43 .20 Abnormal I-III   71.1 .47 .67 Abnormal       Interaural latencies (I-III and I-V) differ by more than 0.40 ms in all conditions except the I-V interaural latency difference in the 11.1 clicks/sec condition.     ASSESSMENT: Interaural latencies differ by more than 0.40 ms in all conditions except the I-V interaural latency difference in the 11.1 clicks/sec condition. Retrocochlear pathology cannot be ruled out at this time.      PLAN: Today's findings will be shared with Dr. Price who will contact the patient to discuss these results and the next steps in their plan of care. Tyler will follow up with audiology and ENT as recommended by ENT or should concerns with hearing arise.    Joao Blunt, CCC-A  Clinical Audiologist   [place] #[id number]     CC: Mayco Price MD

## 2022-12-27 ENCOUNTER — OFFICE VISIT (OUTPATIENT)
Dept: ALLERGY | Facility: CLINIC | Age: 57
End: 2022-12-27
Attending: OTOLARYNGOLOGY
Payer: COMMERCIAL

## 2022-12-27 VITALS
HEART RATE: 77 BPM | BODY MASS INDEX: 30.81 KG/M2 | WEIGHT: 253.1 LBS | OXYGEN SATURATION: 98 % | RESPIRATION RATE: 16 BRPM

## 2022-12-27 DIAGNOSIS — R09.81 NASAL CONGESTION: ICD-10-CM

## 2022-12-27 PROCEDURE — 36415 COLL VENOUS BLD VENIPUNCTURE: CPT | Performed by: ALLERGY & IMMUNOLOGY

## 2022-12-27 PROCEDURE — 86003 ALLG SPEC IGE CRUDE XTRC EA: CPT | Performed by: ALLERGY & IMMUNOLOGY

## 2022-12-27 PROCEDURE — 82785 ASSAY OF IGE: CPT | Performed by: ALLERGY & IMMUNOLOGY

## 2022-12-27 PROCEDURE — 95004 PERQ TESTS W/ALRGNC XTRCS: CPT | Performed by: ALLERGY & IMMUNOLOGY

## 2022-12-27 PROCEDURE — 99244 OFF/OP CNSLTJ NEW/EST MOD 40: CPT | Mod: 25 | Performed by: ALLERGY & IMMUNOLOGY

## 2022-12-27 RX ORDER — MONTELUKAST SODIUM 10 MG/1
10 TABLET ORAL AT BEDTIME
Qty: 30 TABLET | Refills: 3 | Status: SHIPPED | OUTPATIENT
Start: 2022-12-27

## 2022-12-27 NOTE — LETTER
12/27/2022         RE: Tyler Barreto  [address]  [place] [address]        Dear Colleague,    Thank you for referring your patient, Tyler Barreto, to the [facility]. Please see a copy of my visit note below.          Subjective   Tyler is a 57 year old, presenting for the following health issues:  Allergy Consult (Ear pain/pressure, nasal drainage, headaches)      HPI     Chief complaint: Allergies    History of present illness: This is a pleasant 57-year-old gentleman I was asked to see for evaluation by Dr. Price in regards to allergies.  He states he has had symptoms of allergies for many years now.  Symptoms occur year-round but he does have episodes where they worsen.  He states he has a lot of ear plane especially on the right.  He states he had an ear infection that never cleared he now has pressure in that area as well as tinnitus.  He does follow with ENT.  He states he also has allergy symptoms of itchy eyes, runny nose, sneezing, headache as well as drainage down the back of his throat.  No history of asthma.  No cough, wheeze or shortness of breath.  Budesonide rinses and Flonase did not help.  He is currently on cromolyn which he believes helps the most.  Astelin nasal spray caused burning of the nose.  He uses Afrin once weekly.  He reports this does help.  He does use Zyrtec twice daily and states since being off of it for the last month his symptoms worsened.  He was skin tested at the [facility] in July 2020 and it was negative at that time.    Past medical history: Otherwise unremarkable, history of COVID    Social history: He has 2 dogs, works in home health care, former smoker, lives in a home with central air and a basement    Family history: Negative for asthma and allergies    Review of Systems   Constitutional, HEENT, cardiovascular, pulmonary, GI, , musculoskeletal, neuro, skin, endocrine and psych systems are negative, except as  otherwise noted.     Objective    Pulse 77   Resp 16   Wt 114.8 kg (253 lb 1.6 oz)   SpO2 98%   BMI 30.81 kg/m    Body mass index is 30.81 kg/m .  Physical Exam   Gen: Pleasant male not in acute distress  HEENT: Eyes no erythema of the bulbar or palpebral conjunctiva, no edema. Ears: TMs well visualized, no effusions. Nose: No congestion, mucosa normal. Mouth: Throat clear, no lip or tongue edema.   Neck: No visible masses lesions or swelling  Respiratory: No coughing with breathing, no retractions  Lymph: No visible supraclavicular or cervical lymphadenopathy  Skin: No rashes or lesions  Psych: Alert and oriented times 3           At today s visit the patient/parent and I engaged in an informed consent discussion about allergy testing.  We discussed skin testing, blood testing,  and the alternative of not undergoing any testing. The patient has a preference for skin testing. We then discussed the risks and benefits of skin testing.  The patient understands skin testing risks can include, but are not limited to, urticaria, angioedema, shortness of breath, and severe anaphylaxis.  The benefits include, but are not limited, to evaluation for allergens causing symptoms.  After answering the patients/parents questions they have agreed to proceed with skin testing.      30 percutaneous test were placed in environmental skin test panel.  Patient was dermatographic.    Impression report and plan:  1.  Nasal congestion    Allergy testing was inconclusive via skin testing.  Check specific IgE testing.  Despite this, the patient does seem to respond to allergy medication.  Recommended 1 month trial of montelukast.  Cautioned him to the rare side effect of mood disturbance.  I will contact the patient once testing returns.  Without positive tests, he is not a candidate for allergy shots.        Again, thank you for allowing me to participate in the care of your patient.        Sincerely,        Muna JONES MD

## 2022-12-27 NOTE — PROGRESS NOTES
Subjective   Tyler is a 57 year old, presenting for the following health issues:  Allergy Consult (Ear pain/pressure, nasal drainage, headaches)      HPI     Chief complaint: Allergies    History of present illness: This is a pleasant 57-year-old gentleman I was asked to see for evaluation by Dr. Price in regards to allergies.  He states he has had symptoms of allergies for many years now.  Symptoms occur year-round but he does have episodes where they worsen.  He states he has a lot of ear plane especially on the right.  He states he had an ear infection that never cleared he now has pressure in that area as well as tinnitus.  He does follow with ENT.  He states he also has allergy symptoms of itchy eyes, runny nose, sneezing, headache as well as drainage down the back of his throat.  No history of asthma.  No cough, wheeze or shortness of breath.  Budesonide rinses and Flonase did not help.  He is currently on cromolyn which he believes helps the most.  Astelin nasal spray caused burning of the nose.  He uses Afrin once weekly.  He reports this does help.  He does use Zyrtec twice daily and states since being off of it for the last month his symptoms worsened.  He was skin tested at the [facility] in July 2020 and it was negative at that time.    Past medical history: Otherwise unremarkable, history of COVID    Social history: He has 2 dogs, works in home health care, former smoker, lives in a home with central air and a basement    Family history: Negative for asthma and allergies    Review of Systems   Constitutional, HEENT, cardiovascular, pulmonary, GI, , musculoskeletal, neuro, skin, endocrine and psych systems are negative, except as otherwise noted.      Objective    Pulse 77   Resp 16   Wt 114.8 kg (253 lb 1.6 oz)   SpO2 98%   BMI 30.81 kg/m    Body mass index is 30.81 kg/m .  Physical Exam   Gen: Pleasant male not in acute distress  HEENT: Eyes no erythema of the bulbar or palpebral  conjunctiva, no edema. Ears: TMs well visualized, no effusions. Nose: No congestion, mucosa normal. Mouth: Throat clear, no lip or tongue edema.   Neck: No visible masses lesions or swelling  Respiratory: No coughing with breathing, no retractions  Lymph: No visible supraclavicular or cervical lymphadenopathy  Skin: No rashes or lesions  Psych: Alert and oriented times 3            At today s visit the patient/parent and I engaged in an informed consent discussion about allergy testing.  We discussed skin testing, blood testing,  and the alternative of not undergoing any testing. The patient has a preference for skin testing. We then discussed the risks and benefits of skin testing.  The patient understands skin testing risks can include, but are not limited to, urticaria, angioedema, shortness of breath, and severe anaphylaxis.  The benefits include, but are not limited, to evaluation for allergens causing symptoms.  After answering the patients/parents questions they have agreed to proceed with skin testing.      30 percutaneous test were placed in environmental skin test panel.  Patient was dermatographic.    Impression report and plan:  1.  Nasal congestion    Allergy testing was inconclusive via skin testing.  Check specific IgE testing.  Despite this, the patient does seem to respond to allergy medication.  Recommended 1 month trial of montelukast.  Cautioned him to the rare side effect of mood disturbance.  I will contact the patient once testing returns.  Without positive tests, he is not a candidate for allergy shots.

## 2022-12-27 NOTE — PATIENT INSTRUCTIONS
Check blood test    Trial of Montelukast 10 mg daily     Continue Zyrtec and Cromolyn      Dermatographia

## 2022-12-28 LAB
A ALTERNATA IGE QN: <0.1 KU(A)/L
A FUMIGATUS IGE QN: <0.1 KU(A)/L
C HERBARUM IGE QN: <0.1 KU(A)/L
CALIF WALNUT POLN IGE QN: <0.1 KU(A)/L
CAT DANDER IGG QN: <0.1 KU(A)/L
CEDAR IGE QN: <0.1 KU(A)/L
COCKSFOOT IGE QN: <0.1 KU(A)/L
COMMON RAGWEED IGE QN: <0.1 KU(A)/L
COTTONWOOD IGE QN: <0.1 KU(A)/L
D FARINAE IGE QN: <0.1 KU(A)/L
D PTERONYSS IGE QN: <0.1 KU(A)/L
DOG DANDER+EPITH IGE QN: <0.1 KU(A)/L
E PURPURASCENS IGE QN: <0.1 KU(A)/L
EAST WHITE PINE IGE QN: <0.1 KU(A)/L
ENGL PLANTAIN IGE QN: <0.1 KU(A)/L
FIREBUSH IGE QN: <0.1 KU(A)/L
GIANT RAGWEED IGE QN: <0.1 KU(A)/L
GOOSEFOOT IGE QN: <0.1 KU(A)/L
IGE SERPL-ACNC: 28 KU/L (ref 0–114)
JOHNSON GRASS IGE QN: <0.1 KU(A)/L
MAPLE IGE QN: <0.1 KU(A)/L
MUGWORT IGE QN: <0.1 KU(A)/L
NETTLE IGE QN: <0.1 KU(A)/L
P NOTATUM IGE QN: <0.1 KU(A)/L
RED MULBERRY IGE QN: <0.1 KU(A)/L
SALTWORT IGE QN: <0.1 KU(A)/L
SHEEP SORREL IGE QN: <0.1 KU(A)/L
SILVER BIRCH IGE QN: <0.1 KU(A)/L
TIMOTHY IGE QN: <0.1 KU(A)/L
WHITE ASH IGE QN: <0.1 KU(A)/L
WHITE ELM IGE QN: <0.1 KU(A)/L
WHITE MULBERRY IGE QN: <0.1 KU(A)/L
WHITE OAK IGE QN: <0.1 KU(A)/L
WORMWOOD IGE QN: <0.1 KU(A)/L

## 2023-04-15 ENCOUNTER — HEALTH MAINTENANCE LETTER (OUTPATIENT)
Age: 58
End: 2023-04-15

## 2024-06-16 ENCOUNTER — HEALTH MAINTENANCE LETTER (OUTPATIENT)
Age: 59
End: 2024-06-16

## 2025-06-21 ENCOUNTER — HEALTH MAINTENANCE LETTER (OUTPATIENT)
Age: 60
End: 2025-06-21

## (undated) DEVICE — PREP CHLORAPREP 26ML TINTED ORANGE  260815

## (undated) DEVICE — GLOVE PROTEXIS MICRO 8.0  2D73PM80

## (undated) DEVICE — SOL WATER IRRIG 1000ML BOTTLE 2F7114

## (undated) DEVICE — STRAP UNIVERSAL POSITIONING 2-PIECE 4X47X76" 91-287

## (undated) DEVICE — NDL SPINAL 18GA 3.5" 405184

## (undated) DEVICE — SU VICRYL 2-0 CT-1 27" UND J259H

## (undated) DEVICE — MIDAS REX DISSECTING TOOL 2.5MM  T12MH25

## (undated) DEVICE — Device

## (undated) DEVICE — GOWN XLG DISP 9545

## (undated) DEVICE — BLADE CLIPPER SGL USE 9680

## (undated) DEVICE — LINEN ORTHO PACK 5446

## (undated) DEVICE — DRSG ADAPTIC 3X3" 6112

## (undated) DEVICE — BONE WAX 2.5GM W31G

## (undated) DEVICE — NDL ANGIOCATH 14GA 1.25" 4048

## (undated) DEVICE — SOL NACL 0.9% IRRIG 1000ML BOTTLE 2F7124

## (undated) DEVICE — SPONGE RAY-TEC 4X8" 7318

## (undated) DEVICE — BASIN SET MINOR DISP

## (undated) DEVICE — ADHESIVE SWIFTSET 0.8ML OCTYL SS6

## (undated) DEVICE — DRAPE MICRO ZEISS PENTERO 120X54" G650DL

## (undated) DEVICE — SUCTION MANIFOLD NEPTUNE 2 SYS 4 PORT 0702-020-000

## (undated) DEVICE — SU MONOCRYL 3-0 PS-2 18" UND Y497G

## (undated) DEVICE — DRAPE C-ARM W/STRAPS 42X72" 07-CA104

## (undated) DEVICE — ESU ELEC NDL 6" COATED/INSULATED E1465-6

## (undated) DEVICE — DRAPE MAYO STAND 23X54 8337

## (undated) DEVICE — SPONGE COTTONOID 1/2X1" 20-05S

## (undated) DEVICE — ESU ELEC BLADE 2.75" COATED/INSULATED E1455

## (undated) DEVICE — DRAIN HEMOVAC RESERVOIR KIT 10FR 1/8" MED 00-2550-002-10

## (undated) DEVICE — GLOVE PROTEXIS BLUE W/NEU-THERA 8.5  2D73EB85

## (undated) DEVICE — SU VICRYL 2-0 CT-2 27" UND J269H

## (undated) RX ORDER — PROPOFOL 10 MG/ML
INJECTION, EMULSION INTRAVENOUS
Status: DISPENSED
Start: 2017-10-10

## (undated) RX ORDER — EPHEDRINE SULFATE 50 MG/ML
INJECTION, SOLUTION INTRAMUSCULAR; INTRAVENOUS; SUBCUTANEOUS
Status: DISPENSED
Start: 2017-10-10

## (undated) RX ORDER — FENTANYL CITRATE 50 UG/ML
INJECTION, SOLUTION INTRAMUSCULAR; INTRAVENOUS
Status: DISPENSED
Start: 2017-10-10

## (undated) RX ORDER — GABAPENTIN 300 MG/1
CAPSULE ORAL
Status: DISPENSED
Start: 2017-10-10

## (undated) RX ORDER — KETOROLAC TROMETHAMINE 30 MG/ML
INJECTION, SOLUTION INTRAMUSCULAR; INTRAVENOUS
Status: DISPENSED
Start: 2017-10-10

## (undated) RX ORDER — CEFAZOLIN SODIUM 1 G/3ML
INJECTION, POWDER, FOR SOLUTION INTRAMUSCULAR; INTRAVENOUS
Status: DISPENSED
Start: 2017-10-10

## (undated) RX ORDER — ACETAMINOPHEN 325 MG/1
TABLET ORAL
Status: DISPENSED
Start: 2017-10-10

## (undated) RX ORDER — LIDOCAINE HYDROCHLORIDE 20 MG/ML
INJECTION, SOLUTION EPIDURAL; INFILTRATION; INTRACAUDAL; PERINEURAL
Status: DISPENSED
Start: 2017-10-10